# Patient Record
Sex: FEMALE | Race: WHITE | Employment: FULL TIME | URBAN - METROPOLITAN AREA
[De-identification: names, ages, dates, MRNs, and addresses within clinical notes are randomized per-mention and may not be internally consistent; named-entity substitution may affect disease eponyms.]

---

## 2017-02-13 ENCOUNTER — TELEPHONE (OUTPATIENT)
Dept: INTERNAL MEDICINE CLINIC | Age: 42
End: 2017-02-13

## 2017-02-13 NOTE — TELEPHONE ENCOUNTER
----- Message from Neeraj Velasco sent at 2/13/2017 11:51 AM EST -----  Regarding: Dr. Flaca Parham  Patient is in [place] and would like a prescription of Zofran called into the [facility] at [phone]. Best contact number [phone].

## 2017-11-09 ENCOUNTER — OFFICE VISIT (OUTPATIENT)
Dept: INTERNAL MEDICINE CLINIC | Age: 42
End: 2017-11-09

## 2017-11-09 VITALS
DIASTOLIC BLOOD PRESSURE: 73 MMHG | OXYGEN SATURATION: 99 % | BODY MASS INDEX: 23.41 KG/M2 | WEIGHT: 124 LBS | RESPIRATION RATE: 16 BRPM | HEIGHT: 61 IN | SYSTOLIC BLOOD PRESSURE: 110 MMHG | TEMPERATURE: 97.9 F | HEART RATE: 68 BPM

## 2017-11-09 DIAGNOSIS — F33.8 SEASONAL AFFECTIVE DISORDER (HCC): ICD-10-CM

## 2017-11-09 DIAGNOSIS — Z12.39 BREAST CANCER SCREENING: ICD-10-CM

## 2017-11-09 DIAGNOSIS — Z12.4 CERVICAL CANCER SCREENING: ICD-10-CM

## 2017-11-09 DIAGNOSIS — Z00.00 WELL ADULT EXAM: Primary | ICD-10-CM

## 2017-11-09 DIAGNOSIS — Z12.83 SKIN CANCER SCREENING: ICD-10-CM

## 2017-11-09 RX ORDER — BUPROPION HYDROCHLORIDE 150 MG/1
150 TABLET, EXTENDED RELEASE ORAL DAILY
Qty: 90 TAB | Refills: 3 | Status: SHIPPED | OUTPATIENT
Start: 2017-11-09 | End: 2018-11-14 | Stop reason: SDUPTHER

## 2017-11-09 RX ORDER — CETIRIZINE HCL 10 MG
TABLET ORAL
COMMUNITY
End: 2019-08-07 | Stop reason: ALTCHOICE

## 2017-11-09 NOTE — PATIENT INSTRUCTIONS

## 2017-11-09 NOTE — MR AVS SNAPSHOT
Visit Information Date & Time Provider Department Dept. Phone Encounter #  
 11/9/2017  1:00 PM Zenobia Jackson MD [facility] [address] [id number] Upcoming Health Maintenance Date Due  
 PAP AKA CERVICAL CYTOLOGY 6/8/2019 DTaP/Tdap/Td series (2 - Td) 7/17/2025 Allergies as of 11/9/2017  Review Complete On: 11/9/2017 By: Zenobia Jackson MD  
 No Known Allergies Current Immunizations  Reviewed on 8/1/2014 Name Date Hep B Vaccine 1/1/1994 Influenza Vaccine 10/1/2013 TB Skin Test (PPD) 1/1/2011 Tdap 7/17/2015 12:56 PM  
  
 Not reviewed this visit You Were Diagnosed With   
  
 Codes Comments Well adult exam    -  Primary ICD-10-CM: Z00.00 ICD-9-CM: V70.0 Cervical cancer screening     ICD-10-CM: Z12.4 ICD-9-CM: V76.2 Breast cancer screening     ICD-10-CM: Z12.31 
ICD-9-CM: V76.10 Skin cancer screening     ICD-10-CM: Z12.83 ICD-9-CM: V76.43 Seasonal affective disorder ([address]     ICD-10-CM: F33.9 ICD-9-CM: 296.99 Vitals BP Pulse Temp Resp Height(growth percentile) Weight(growth percentile) 110/73 (BP 1 Location: Left arm, BP Patient Position: Sitting) 68 97.9 °F (36.6 °C) (Oral) 16 5' 1\" (1.549 m) 124 lb (56.2 kg) SpO2 BMI OB Status Smoking Status 99% 23.43 kg/m2 IUD Never Smoker Vitals History BMI and BSA Data Body Mass Index Body Surface Area  
 23.43 kg/m 2 1.56 m 2 Preferred Pharmacy Pharmacy Name Phone [id number] [phone] Your Updated Medication List  
  
   
This list is accurate as of: 11/9/17  2:06 PM.  Always use your most recent med list.  
  
  
  
  
 albuterol 90 mcg/actuation inhaler Commonly known as:  PROVENTIL HFA, VENTOLIN HFA, PROAIR HFA Take 2 Puffs by inhalation every four (4) hours as needed for Wheezing. buPROPion  mg SR tablet Commonly known as:  Daysi Araiza Take 1 Tab by mouth daily. estradiol 0.01 % (0.1 mg/gram) vaginal cream  
Commonly known as:  ESTRACE Apply with cotton swab once three times weekly. VITAMIN D3 1,000 unit Cap Generic drug:  cholecalciferol Take  by mouth daily. ZyrTEC 10 mg tablet Generic drug:  cetirizine Take  by mouth. Prescriptions Sent to Pharmacy Refills buPROPion SR (WELLBUTRIN SR) 150 mg SR tablet 3 Sig: Take 1 Tab by mouth daily. Class: Normal  
 Pharmacy: [id number] [place], [address] Ph #: [phone] Route: Oral  
  
We Performed the Following CBC W/O DIFF [07795 CPT(R)] LIPID PANEL [08569 CPT(R)] METABOLIC PANEL, COMPREHENSIVE [77403 CPT(R)] PAP IG, APTIMA HPV AND RFX 16/18,45 ([id number]) [[id number] Custom] REFERRAL TO DERMATOLOGY [REF19 Custom] Comments:  
 screening TSH 3RD GENERATION [51790 CPT(R)] VITAMIN D, 25 HYDROXY [id number] CPT(R)] To-Do List   
 11/09/2017 Imaging:  [facility] BI SCREENING INCL CAD Referral Information Referral ID Referred By Referred To  
  
 [id number] Derick Guido MD   
   [id number] [facility] [address] , [address] [place] [address] Phone: [phone] Fax: [phone] Visits Status Start Date End Date 1 Pending Review 11/9/17 11/9/18 If your referral has a status of pending review or denied, additional information will be sent to support the outcome of this decision. Patient Instructions Well Visit, Ages 25 to 48: Care Instructions Your Care Instructions Physical exams can help you stay healthy. Your doctor has checked your overall health and may have suggested ways to take good care of yourself. He or she also may have recommended tests.  At home, you can help prevent illness with healthy eating, regular exercise, and other steps. Follow-up care is a key part of your treatment and safety. Be sure to make and go to all appointments, and call your doctor if you are having problems. It's also a good idea to know your test results and keep a list of the medicines you take. How can you care for yourself at home? · Reach and stay at a healthy weight. This will lower your risk for many problems, such as obesity, diabetes, heart disease, and high blood pressure. · Get at least 30 minutes of physical activity on most days of the week. Walking is a good choice. You also may want to do other activities, such as running, swimming, cycling, or playing tennis or team sports. Discuss any changes in your exercise program with your doctor. · Do not smoke or allow others to smoke around you. If you need help quitting, talk to your doctor about stop-smoking programs and medicines. These can increase your chances of quitting for good. · Talk to your doctor about whether you have any risk factors for sexually transmitted infections (STIs). Having one sex partner (who does not have STIs and does not have sex with anyone else) is a good way to avoid these infections. · Use birth control if you do not want to have children at this time. Talk with your doctor about the choices available and what might be best for you. · Protect your skin from too much sun. When you're outdoors from 10 a.m. to 4 p.m., stay in the shade or cover up with clothing and a hat with a wide brim. Wear sunglasses that block UV rays. Even when it's cloudy, put broad-spectrum sunscreen (SPF 30 or higher) on any exposed skin. · See a dentist one or two times a year for checkups and to have your teeth cleaned. · Wear a seat belt in the car. · Drink alcohol in moderation, if at all. That means no more than 2 drinks a day for men and 1 drink a day for women. Follow your doctor's advice about when to have certain tests. These tests can spot problems early. For everyone · Cholesterol. Have the fat (cholesterol) in your blood tested after age 21. Your doctor will tell you how often to have this done based on your age, family history, or other things that can increase your risk for heart disease. · Blood pressure. Have your blood pressure checked during a routine doctor visit. Your doctor will tell you how often to check your blood pressure based on your age, your blood pressure results, and other factors. · Vision. Talk with your doctor about how often to have a glaucoma test. 
· Diabetes. Ask your doctor whether you should have tests for diabetes. · Colon cancer. Have a test for colon cancer at age 48. You may have one of several tests. If you are younger than 48, you may need a test earlier if you have any risk factors. Risk factors include whether you already had a precancerous polyp removed from your colon or whether your parent, brother, sister, or child has had colon cancer. For women · Breast exam and mammogram. Talk to your doctor about when you should have a clinical breast exam and a mammogram. Medical experts differ on whether and how often women under 50 should have these tests. Your doctor can help you decide what is right for you. · Pap test and pelvic exam. Begin Pap tests at age 24. A Pap test is the best way to find cervical cancer. The test often is part of a pelvic exam. Ask how often to have this test. 
· Tests for sexually transmitted infections (STIs). Ask whether you should have tests for STIs. You may be at risk if you have sex with more than one person, especially if your partners do not wear condoms. For men · Tests for sexually transmitted infections (STIs). Ask whether you should have tests for STIs. You may be at risk if you have sex with more than one person, especially if you do not wear a condom. · Testicular cancer exam. Ask your doctor whether you should check your testicles regularly. · Prostate exam. Talk to your doctor about whether you should have a blood test (called a PSA test) for prostate cancer. Experts differ on whether and when men should have this test. Some experts suggest it if you are older than 39 and are -American or have a father or brother who got prostate cancer when he was younger than 72. When should you call for help? Watch closely for changes in your health, and be sure to contact your doctor if you have any problems or symptoms that concern you. Where can you learn more? Go to http://nava-jaime.info/. Enter P072 in the search box to learn more about \"Well Visit, Ages 25 to 48: Care Instructions. \" Current as of: May 12, 2017 Content Version: 11.4 © 9237-9675 [employer]. Care instructions adapted under license by [employer] (which disclaims liability or warranty for this information). If you have questions about a medical condition or this instruction, always ask your healthcare professional. [place] [address] any warranty or liability for your use of this information. Introducing [address]! Dear Ricky Cuenca: Thank you for requesting a [employer] account. Our records indicate that you already have an active [employer] account. You can access your account anytime at [email or web address] Did you know that you can access your hospital and ER discharge instructions at any time in [employer]? You can also review all of your test results from your hospital stay or ER visit. Additional Information If you have questions, please visit the Frequently Asked Questions section of the [employer] website at [email or web address] Remember, [employer] is NOT to be used for urgent needs. For medical emergencies, dial 911. Now available from your iPhone and Android! Please provide this summary of care documentation to your next provider. Your primary care clinician is listed as Aleisha Yeh. If you have any questions after today's visit, please call [phone].

## 2017-11-09 NOTE — PROGRESS NOTES
Chief Complaint   Patient presents with    Complete Physical      Aracely Nix is a 38 y.o. female and presents with Complete Physical  .daughter of Katherine Mendes from [address] is here for her annual.          Subjective:  Depression  Since her last visit she had a rough go through the summer. She reports she was struggling. She read Fl[facility]ish by Dr. Evertt Gaucher. She reports she was really depressed. Her neighbor told her about CBT study at [facility]. She feels she is better now following CBT . Her MH now is 7/10 while on wellbutin. She enjoys playing tennis. She did an online assessment and found she was depressed. She is not taking zoloft not on for > 6 months. She does not think it was when she came of the zoloft. Menopausal bleed  X 5 days  New symptoms per pt as she has not had menses in 7 years     Allergies  Taking prn benedryl    Review of Systems  Constitutional: negative for fevers, chills, anorexia and weight loss  Eyes:   negative for visual disturbance and irritation  ENT:   negative for tinnitus,sore throat,nasal congestion,ear pains. hoarseness  Respiratory:  negative for cough, hemoptysis, dyspnea,wheezing  CV:   negative for chest pain, palpitations, lower extremity edema  GI:   negative for nausea, vomiting, diarrhea, abdominal pain,melena  Endo:               negative for polyuria,polydipsia,polyphagia,heat intolerance  Genitourinary: negative for frequency, dysuria and hematuria  Integument:  negative for rash and pruritus  Hematologic:  negative for easy bruising and gum/nose bleeding  Musculoskel: negative for myalgias, arthralgias, back pain, muscle weakness, joint pain  Neurological:  negative for headaches, dizziness, vertigo, memory problems and gait   Behavl/Psych: negative for feelings of anxiety, depression, mood changes    Past Medical History:   Diagnosis Date    Allergic rhinitis, seasonal     Depression     HX OTHER MEDICAL 2000    Pneumothorax    Pneumothorax, spontaneous, tension 2000    26 yo (2001 and 2002), right sided     Past Surgical History:   Procedure Laterality Date    HX BREAST AUGMENTATION  10/2012    HX HEENT  2/2015    nasal     HX OTHER SURGICAL  2000    Pneumothroax     Social History     Social History    Marital status:      Spouse name: N/A    Number of children: N/A    Years of education: N/A     Occupational History    RN      Social History Main Topics    Smoking status: Never Smoker    Smokeless tobacco: Never Used    Alcohol use Yes      Comment: 6-8 glasses of wine per week    Drug use: No    Sexual activity: Yes     Partners: Male     Other Topics Concern    None     Social History Narrative    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 Works t-r; 8-5 pm     Family History   Problem Relation Age of Onset    Heart Disease Other      Paternal Side of Family    Other Maternal Grandfather      Aneurysm    Stroke Maternal Grandfather     Stroke Maternal Grandmother     Heart Disease Paternal Grandmother     Cancer Paternal Grandfather      lung    Other Mother 72     osteoporosis    Thyroid Disease Father     Breast Cancer Neg Hx      Current Outpatient Prescriptions   Medication Sig Dispense Refill    cetirizine (ZYRTEC) 10 mg tablet Take  by mouth.  buPROPion SR (WELLBUTRIN SR) 150 mg SR tablet Take 1 Tab by mouth daily. 90 Tab 3    ondansetron (ZOFRAN ODT) 4 mg disintegrating tablet Take 1 Tab by mouth every eight (8) hours as needed for Nausea. 30 Tab 0    guaiFENesin-codeine (ROBITUSSIN AC)  mg/5 mL solution Take 5 mL by mouth three (3) times daily as needed for Cough. Max Daily Amount: 15 mL. 100 mL 0    IBUPROFEN (MOTRIN PO) Take 800 mg by mouth three (3) times daily (with meals).  acetaminophen (TYLENOL) 500 mg tablet Take 650 mg by mouth every six (6) hours as needed for Pain.       albuterol (PROVENTIL HFA, VENTOLIN HFA, PROAIR HFA) 90 mcg/actuation inhaler Take 2 Puffs by inhalation every four (4) hours as needed for Wheezing. 1 Inhaler 1    sertraline (ZOLOFT) 50 mg tablet Take 1 Tab by mouth daily. Indications: SEASONAL AFFECTIVE DISORDER 90 Tab 3    Cholecalciferol, Vitamin D3, (VITAMIN D3) 1,000 unit cap Take  by mouth daily.  ondansetron (ZOFRAN ODT) 8 mg disintegrating tablet Take 1 Tab by mouth every eight (8) hours as needed for Nausea. 6 Tab 0    estradiol (ESTRACE) 0.01 % (0.1 mg/gram) vaginal cream Apply with cotton swab once three times weekly. 42.5 g 0     No Known Allergies    Objective:  Visit Vitals    /73 (BP 1 Location: Left arm, BP Patient Position: Sitting)    Pulse 68    Temp 97.9 °F (36.6 °C) (Oral)    Resp 16    Ht 5' 1\" (1.549 m)    Wt 124 lb (56.2 kg)    SpO2 99%    BMI 23.43 kg/m2     Physical Exam:   General appearance - alert, well appearing, and in no distress  Mental status - alert, oriented to person, place, and time  EYE-ZAYNAB, EOMI, visual acuity normal both eyes  ENT-ENT exam normal, no neck nodes or sinus tenderness  Nose - normal and patent, no erythema, discharge or polyps  Mouth - mucous membranes moist, pharynx normal without lesions  Neck - supple, no significant adenopathy   Chest - clear to auscultation, no wheezes, rales or rhonchi, symmetric air entry   Heart - normal rate, regular rhythm, normal S1, S2, no murmurs, rubs, clicks or gallops   Abdomen - soft, nontender, nondistended, no masses or organomegaly  Lymph- no adenopathy palpable  Ext-peripheral pulses normal, no pedal edema, no clubbing or cyanosis  Skin-Warm and dry.  no hyperpigmentation, vitiligo, or suspicious lesions  Neuro -alert, oriented, normal speech, no focal findings or movement disorder noted  Neck-normal C-spine, no tenderness, full ROM without pain  Gyn: no inguinal LAD, cervix in view and IUD noted      Results for orders placed or performed in visit on 09/07/16   CBC WITH AUTOMATED DIFF   Result Value Ref Range    WBC 11.2 (H) 3.4 - 10.8 x10E3/uL    RBC 4.38 3.77 - 5.28 x10E6/uL    HGB 13.6 11.1 - 15.9 g/dL    HCT 41.5 34.0 - 46.6 %    MCV 95 79 - 97 fL    MCH 31.1 26.6 - 33.0 pg    MCHC 32.8 31.5 - 35.7 g/dL    RDW 12.6 12.3 - 15.4 %    PLATELET 928 013 - 974 x10E3/uL    NEUTROPHILS 64 %    Lymphocytes 23 %    MONOCYTES 8 %    EOSINOPHILS 5 %    BASOPHILS 0 %    ABS. NEUTROPHILS 7.0 1.4 - 7.0 x10E3/uL    Abs Lymphocytes 2.6 0.7 - 3.1 x10E3/uL    ABS. MONOCYTES 0.9 0.1 - 0.9 x10E3/uL    ABS. EOSINOPHILS 0.6 (H) 0.0 - 0.4 x10E3/uL    ABS. BASOPHILS 0.0 0.0 - 0.2 x10E3/uL    IMMATURE GRANULOCYTES 0 %    ABS. IMM. GRANS. 0.0 0.0 - 0.1 x10E3/uL   SED RATE (ESR)   Result Value Ref Range    Sed rate (ESR) 2 0 - 32 mm/hr   METABOLIC PANEL, COMPREHENSIVE   Result Value Ref Range    Glucose 76 65 - 99 mg/dL    BUN 14 6 - 20 mg/dL    Creatinine 0.87 0.57 - 1.00 mg/dL    GFR est non-AA 84 >59 mL/min/1.73    GFR est AA 97 >59 mL/min/1.73    BUN/Creatinine ratio 16 8 - 20    Sodium 141 134 - 144 mmol/L    Potassium 3.6 3.5 - 5.2 mmol/L    Chloride 100 97 - 108 mmol/L    CO2 24 18 - 29 mmol/L    Calcium 9.2 8.7 - 10.2 mg/dL    Protein, total 6.9 6.0 - 8.5 g/dL    Albumin 4.4 3.5 - 5.5 g/dL    GLOBULIN, TOTAL 2.5 1.5 - 4.5 g/dL    A-G Ratio 1.8 1.1 - 2.5    Bilirubin, total 0.5 0.0 - 1.2 mg/dL    Alk.  phosphatase 50 39 - 117 IU/L    AST (SGOT) 18 0 - 40 IU/L    ALT (SGPT) 12 0 - 32 IU/L   TSH 3RD GENERATION   Result Value Ref Range    TSH 2.320 0.450 - 4.500 uIU/mL   MAGNESIUM   Result Value Ref Range    Magnesium 2.0 1.6 - 2.3 mg/dL   URINALYSIS W/MICROSCOPIC   Result Value Ref Range    Specific Gravity 1.012 1.005 - 1.030    pH (UA) 6.0 5.0 - 7.5    Color Yellow Yellow    Appearance Clear Clear    Leukocyte Esterase Negative Negative    Protein Negative Negative/Trace    Glucose Negative Negative    Ketone Negative Negative    Blood Negative Negative    Bilirubin Negative Negative    Urobilinogen 0.2 0.2 - 1.0 mg/dL    Nitrites Negative Negative    Microscopic Examination Comment     Microscopic exam See additional order    MICROSCOPIC EXAMINATION   Result Value Ref Range    WBC 0-5 0 - 5 /hpf    RBC None seen 0 - 2 /hpf    Epithelial cells 0-10 0 - 10 /hpf    Casts None seen None seen /lpf    Mucus Present Not Estab. Bacteria Few None seen/Few     Prevention    Cardiovascular profile  Family hx  Exercising:  Tennis, starting with  to do cross . Blood pressure:  Health healthy diet:  Diabetes:  Cholesterol:  Renal function:      Cancer risk profile  Mammogram start, on IUd ring  Lung no sx, exertional   Colonoscopy no sxy  Skin nonhealing in 2 weeks, emeli Elkins  Gyn abnormal bleeding/discharge/abd pain/pressure      Thyroid sx  Weight stable    Osteopenia prevention  Calcium 1000mg/day yes  Vitamin D 800iu/day yes    Mental health scale: 8-9/10  Depression  Anxiety  Sleep # of hours:  Energy Level:        Immunizations uptodate  TDAP ?2011  Pneumonia vaccine  Flu vaccine annually  Shingles vaccine  HPV      . Diagnoses and all orders for this visit:    1. Well adult exam  Pt presents in overall good health. She went thru a period of depression and improved with CBT training from [facility]. Discussed with her to monitor [address] through fall season and can increase wellbutrin if needed. -     CBC W/O DIFF  -     METABOLIC PANEL, COMPREHENSIVE  -     LIPID PANEL  -     TSH 3RD GENERATION  -     VITAMIN D, 25 HYDROXY    2. Cervical cancer screening  DISCUSSED WITH PT SHE NEEDS TO FOLLOW UP WITH GYN RE: NEW BLEEDING WITH IUD. Pt agrees and will call  -     PAP IG, APTIMA HPV AND RFX 16/18,45 ([id number])    3. Breast cancer screening  -     [facility] MAMMO BI SCREENING INCL CAD; Future    4.  Skin cancer screening  -     REFERRAL TO DERMATOLOGY    5. Seasonal affective disorder ([facility] [address]  Improved with CBT  Can increase wellbutrin if needed but I don't think she needs right now  Encouraged activity with tennis indoor/outdoor as tolerated  -     buPROPion SR (WELLBUTRIN SR) 150 mg SR tablet; Take 1 Tab by mouth daily. This note will not be viewable in [address]

## 2017-11-10 LAB
25(OH)D3+25(OH)D2 SERPL-MCNC: 43.7 NG/ML (ref 30–100)
ALBUMIN SERPL-MCNC: 4.3 G/DL (ref 3.5–5.5)
ALBUMIN/GLOB SERPL: 2 {RATIO} (ref 1.2–2.2)
ALP SERPL-CCNC: 53 IU/L (ref 39–117)
ALT SERPL-CCNC: 11 IU/L (ref 0–32)
AST SERPL-CCNC: 18 IU/L (ref 0–40)
BILIRUB SERPL-MCNC: 0.4 MG/DL (ref 0–1.2)
BUN SERPL-MCNC: 19 MG/DL (ref 6–24)
BUN/CREAT SERPL: 20 (ref 9–23)
CALCIUM SERPL-MCNC: 8.9 MG/DL (ref 8.7–10.2)
CHLORIDE SERPL-SCNC: 104 MMOL/L (ref 96–106)
CHOLEST SERPL-MCNC: 134 MG/DL (ref 100–199)
CO2 SERPL-SCNC: 24 MMOL/L (ref 18–29)
CREAT SERPL-MCNC: 0.96 MG/DL (ref 0.57–1)
ERYTHROCYTE [DISTWIDTH] IN BLOOD BY AUTOMATED COUNT: 13 % (ref 12.3–15.4)
GFR SERPLBLD CREATININE-BSD FMLA CKD-EPI: 74 ML/MIN/1.73
GFR SERPLBLD CREATININE-BSD FMLA CKD-EPI: 86 ML/MIN/1.73
GLOBULIN SER CALC-MCNC: 2.2 G/DL (ref 1.5–4.5)
GLUCOSE SERPL-MCNC: 80 MG/DL (ref 65–99)
HCT VFR BLD AUTO: 36.7 % (ref 34–46.6)
HDLC SERPL-MCNC: 66 MG/DL
HGB BLD-MCNC: 12.2 G/DL (ref 11.1–15.9)
INTERPRETATION, 910389: NORMAL
LDLC SERPL CALC-MCNC: 58 MG/DL (ref 0–99)
MCH RBC QN AUTO: 30.4 PG (ref 26.6–33)
MCHC RBC AUTO-ENTMCNC: 33.2 G/DL (ref 31.5–35.7)
MCV RBC AUTO: 92 FL (ref 79–97)
PLATELET # BLD AUTO: 197 X10E3/UL (ref 150–379)
POTASSIUM SERPL-SCNC: 4 MMOL/L (ref 3.5–5.2)
PROT SERPL-MCNC: 6.5 G/DL (ref 6–8.5)
RBC # BLD AUTO: 4.01 X10E6/UL (ref 3.77–5.28)
SODIUM SERPL-SCNC: 141 MMOL/L (ref 134–144)
TRIGL SERPL-MCNC: 52 MG/DL (ref 0–149)
TSH SERPL DL<=0.005 MIU/L-ACNC: 1.34 UIU/ML (ref 0.45–4.5)
VLDLC SERPL CALC-MCNC: 10 MG/DL (ref 5–40)
WBC # BLD AUTO: 10.6 X10E3/UL (ref 3.4–10.8)

## 2017-11-13 ENCOUNTER — TELEPHONE (OUTPATIENT)
Dept: INTERNAL MEDICINE CLINIC | Age: 42
End: 2017-11-13

## 2017-11-13 DIAGNOSIS — J45.909 RAD (REACTIVE AIRWAY DISEASE), UNSPECIFIED ASTHMA SEVERITY, UNCOMPLICATED: ICD-10-CM

## 2017-11-13 NOTE — TELEPHONE ENCOUNTER
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 Pt can be reached at [phone].

## 2017-11-14 RX ORDER — ALBUTEROL SULFATE 90 UG/1
2 AEROSOL, METERED RESPIRATORY (INHALATION)
Qty: 1 INHALER | Refills: 1 | Status: SHIPPED | OUTPATIENT
Start: 2017-11-14 | End: 2022-01-14 | Stop reason: ALTCHOICE

## 2017-11-14 NOTE — TELEPHONE ENCOUNTER
Patient stated she has exercise induced asthma and would like PCP to prescribed something that she can use prior to exercise. Pt also stated she would like a refill of her albuterol.

## 2018-01-31 ENCOUNTER — OFFICE VISIT (OUTPATIENT)
Dept: INTERNAL MEDICINE CLINIC | Age: 43
End: 2018-01-31

## 2018-01-31 VITALS
HEART RATE: 77 BPM | BODY MASS INDEX: 19.46 KG/M2 | SYSTOLIC BLOOD PRESSURE: 119 MMHG | DIASTOLIC BLOOD PRESSURE: 80 MMHG | WEIGHT: 124 LBS | OXYGEN SATURATION: 99 % | RESPIRATION RATE: 17 BRPM | TEMPERATURE: 97.9 F | HEIGHT: 67 IN

## 2018-01-31 DIAGNOSIS — R10.30 LOWER ABDOMINAL PAIN: Primary | ICD-10-CM

## 2018-01-31 RX ORDER — HYOSCYAMINE SULFATE 0.12 MG/1
0.12 TABLET, ORALLY DISINTEGRATING ORAL
Qty: 15 TAB | Refills: 0 | Status: SHIPPED | OUTPATIENT
Start: 2018-01-31 | End: 2020-01-16 | Stop reason: ALTCHOICE

## 2018-01-31 NOTE — MR AVS SNAPSHOT
[facility] 
 
 
 [address] [place] [address] [facility] 
[phone] Patient: Gardenia Garsia MRN: [id number] GMA:[birth year] Visit Information Date & Time Provider Department Dept. Phone Encounter #  
 1/31/2018 12:20 PM Rk Gaines MD [facility] [address] [id number] Upcoming Health Maintenance Date Due  
 PAP AKA CERVICAL CYTOLOGY 11/15/2020 DTaP/Tdap/Td series (2 - Td) 7/17/2025 Allergies as of 1/31/2018  Review Complete On: 1/31/2018 By: Rk Gaines MD  
 No Known Allergies Current Immunizations  Reviewed on 8/1/2014 Name Date Hep B Vaccine 1/1/1994 Influenza Vaccine 10/1/2013 TB Skin Test (PPD) 1/1/2011 Tdap 7/17/2015 12:56 PM  
  
 Not reviewed this visit You Were Diagnosed With   
  
 Codes Comments Lower abdominal pain    -  Primary ICD-10-CM: R10.30 ICD-9-CM: 789.09 Vitals BP Pulse Temp Resp Height(growth percentile) Weight(growth percentile) 119/80 77 97.9 °F (36.6 °C) (Oral) 17 5' 7\" (1.702 m) 124 lb (56.2 kg) SpO2 BMI OB Status Smoking Status 99% 19.42 kg/m2 IUD Never Smoker BMI and BSA Data Body Mass Index Body Surface Area  
 19.42 kg/m 2 1.63 m 2 Preferred Pharmacy Pharmacy Name Phone [id number] [address] [phone] Your Updated Medication List  
  
   
This list is accurate as of: 1/31/18  2:02 PM.  Always use your most recent med list.  
  
  
  
  
 albuterol 90 mcg/actuation inhaler Commonly known as:  PROVENTIL HFA, VENTOLIN HFA, PROAIR HFA Take 2 Puffs by inhalation every four (4) hours as needed for Wheezing. buPROPion  mg SR tablet Commonly known as:  Jaimie Torres Take 1 Tab by mouth daily.   
  
 estradiol 0.01 % (0.1 mg/gram) vaginal cream  
Commonly known as:  ESTRACE  
 Apply with cotton swab once three times weekly. hyoscyamine sulfate 0.125 mg tablet Commonly known as:  Олег Powell Take 1 Tab by mouth every four (4) hours as needed. VITAMIN D3 1,000 unit Cap Generic drug:  cholecalciferol Take  by mouth daily. ZyrTEC 10 mg tablet Generic drug:  cetirizine Take  by mouth. Prescriptions Sent to Pharmacy Refills  
 hyoscyamine sulfate (NULEV) 0.125 mg tablet 0 Sig: Take 1 Tab by mouth every four (4) hours as needed. Class: Normal  
 Pharmacy: [facility]/pharmacy #[id number] Ph #: [phone] Route: Oral  
  
Patient Instructions Abdominal Pain: Care Instructions Your Care Instructions Abdominal pain has many possible causes. Some aren't serious and get better on their own in a few days. Others need more testing and treatment. If your pain continues or gets worse, you need to be rechecked and may need more tests to find out what is wrong. You may need surgery to correct the problem. Don't ignore new symptoms, such as fever, nausea and vomiting, urination problems, pain that gets worse, and dizziness. These may be signs of a more serious problem. Your doctor may have recommended a follow-up visit in the next 8 to 12 hours. If you are not getting better, you may need more tests or treatment. The doctor has checked you carefully, but problems can develop later. If you notice any problems or new symptoms, get medical treatment right away. Follow-up care is a key part of your treatment and safety. Be sure to make and go to all appointments, and call your doctor if you are having problems. It's also a good idea to know your test results and keep a list of the medicines you take. How can you care for yourself at home? · Rest until you feel better.  
· To prevent dehydration, drink plenty of fluids, enough so that your urine is light yellow or clear like water. Choose water and other caffeine-free clear liquids until you feel better. If you have kidney, heart, or liver disease and have to limit fluids, talk with your doctor before you increase the amount of fluids you drink. · If your stomach is upset, eat mild foods, such as rice, dry toast or crackers, bananas, and applesauce. Try eating several small meals instead of two or three large ones. · Wait until 48 hours after all symptoms have gone away before you have spicy foods, alcohol, and drinks that contain caffeine. · Do not eat foods that are high in fat. · Avoid anti-inflammatory medicines such as aspirin, ibuprofen (Advil, Motrin), and naproxen (Aleve). These can cause stomach upset. Talk to your doctor if you take daily aspirin for another health problem. When should you call for help? Call 911 anytime you think you may need emergency care. For example, call if: 
? · You passed out (lost consciousness). ? · You pass maroon or very bloody stools. ? · You vomit blood or what looks like coffee grounds. ? · You have new, severe belly pain. ?Call your doctor now or seek immediate medical care if: 
? · Your pain gets worse, especially if it becomes focused in one area of your belly. ? · You have a new or higher fever. ? · Your stools are black and look like tar, or they have streaks of blood. ? · You have unexpected vaginal bleeding. ? · You have symptoms of a urinary tract infection. These may include: 
¨ Pain when you urinate. ¨ Urinating more often than usual. 
¨ Blood in your urine. ? · You are dizzy or lightheaded, or you feel like you may faint. ? Watch closely for changes in your health, and be sure to contact your doctor if: 
? · You are not getting better after 1 day (24 hours). Where can you learn more? Go to http://nava-jaime.info/.  
Enter W609 in the search box to learn more about \"Abdominal Pain: Care Instructions. \" Current as of: March 20, 2017 Content Version: 11.4 © 1779-0396 [employer]. Care instructions adapted under license by [employer] (which disclaims liability or warranty for this information). If you have questions about a medical condition or this instruction, always ask your healthcare professional. [address] any warranty or liability for your use of this information. Hyoscyamine (By mouth) Hyoscyamine ([id number]-bt-QWV-a-meen) Reduces muscle activity, including muscle spasms in the digestive system. Dries and reduces secretions, such as reducing acid in the stomach. May treat allergy symptoms and several other conditions. Brand Name(s): Anaspaz, Ed-Spaz, Hyosyne, Levbid, Levsin, Levsin/SL, NuLev, Oscimin, Oscimin-SR, Symax DuoTab, Symax FasTabs, Symax-SL, Symax-SR There may be other brand names for this medicine. When This Medicine Should Not Be Used: This medicine is not right for everyone. Do not use it if you had an allergic reaction to hyoscyamine. How to Use This Medicine:  
Tablet, Spray, Chewable Tablet, Dissolving Tablet, Long Acting Tablet, Long Acting Capsule, Liquid · Your doctor will tell you how much medicine to use. Do not use more than directed. · Measure the oral liquid medicine with a marked measuring spoon, oral syringe, or medicine cup. If the medicine came with a dropper, use the dropper to measure each dose. · Make sure your hands are dry before you handle the disintegrating tablet. Peel back the foil from the blister pack, then remove the tablet. Do not push the tablet through the foil. Place the tablet in your mouth. After it has melted, swallow or take a drink of water. · Swallow the extended-release tablet whole. Do not crush, break, or chew it. · Tablet: You might need to take the regular tablet about 30 minutes to 1 hour before you eat a meal. Ask your pharmacist about your specific brand. · Missed dose: Take a dose as soon as you remember. If it is almost time for your next dose, wait until then and take a regular dose. Do not take extra medicine to make up for a missed dose. · Store the medicine in a closed container at room temperature, away from heat, moisture, and direct light. Drugs and Foods to Avoid: Ask your doctor or pharmacist before using any other medicine, including over-the-counter medicines, vitamins, and herbal products. · Some foods and medicines can affect how hyoscyamine works. Tell your doctor if you are also using any of the following: ¨ Amantadine, ketoconazole, haloperidol, metoclopramide, potassium supplement ¨ Antihistamine ¨ Narcotic pain medicine ¨ Phenothiazine medicine, including chlorpromazine, perphenazine, promethazine, prochlorperazine, thioridazine ¨ MAO inhibitor ¨ Tricyclic antidepressant ¨ Medicine to treat or prevent diarrhea · If you take an antacid, do not take it at the same time you take hyoscyamine. Take your hyoscyamine before a meal and then the antacid after the meal. 
Warnings While Using This Medicine: · Tell your doctor if you are pregnant or breastfeeding, or if you have glaucoma, trouble urinating, myasthenia gravis, overactive thyroid, kidney disease, high blood pressure, heart rhythm problems, heart disease, or autonomic neuropathy. Tell your doctor about all digestion problems, including colitis, reflux disease (GERD), blocked intestines, or gastric ulcer. · This medicine may cause you to sweat less and overheat. Avoid hot temperatures. · This medicine may make you dizzy or drowsy or give you blurred vision. Do not drive or do anything else that could be dangerous until you know how this medicine affects you. · Keep all medicine out of the reach of children. Never share your medicine with anyone. Possible Side Effects While Using This Medicine:  
Call your doctor right away if you notice any of these side effects: · Allergic reaction: Itching or hives, swelling in your face or hands, swelling or tingling in your mouth or throat, chest tightness, trouble breathing · Blurred vision that does not go away · Fast heartbeat, dizziness · Unusual behavior, such as confusion, memory loss, anxiety, trouble sleeping, or hallucinations If you notice these less serious side effects, talk with your doctor: · Dry mouth If you notice other side effects that you think are caused by this medicine, tell your doctor. Call your doctor for medical advice about side effects. You may report side effects to FDA at 5-743-FLD-1870 © 2017 [address] Information is for End User's use only and may not be sold, redistributed or otherwise used for commercial purposes. The above information is an  only. It is not intended as medical advice for individual conditions or treatments. Talk to your doctor, nurse or pharmacist before following any medical regimen to see if it is safe and effective for you. Introducing [facility]! Dear [address] [place] [address],[address]: Thank you for requesting a Divvyshot account. Our records indicate that you already have an active Divvyshot account. You can access your account anytime at [email or web address] Did you know that you can access your hospital and ER discharge instructions at any time in Divvyshot? You can also review all of your test results from your hospital stay or ER visit. Additional Information If you have questions, please visit the Frequently Asked Questions section of the Divvyshot website at [email or web address] Remember, Divvyshot is NOT to be used for urgent needs. For medical emergencies, dial 911. Now available from your iPhone and Android! Please provide this summary of care documentation to your next provider. Your primary care clinician is listed as Clarisa Sutton.  If you have any questions after today's visit, please call [phone].

## 2018-01-31 NOTE — PROGRESS NOTES
Chief Complaint   Patient presents with    Abdominal Pain    Nausea    Gas    Constipation     Abdominal pain  Pt reports she has had 3 episodes of abdominal pain. It wakes her up at night. It starts at 11 pm lasts till 11 pm pain resoves by 6-7 in the am but can't eat and feels sick. She reports it starts in lower abdomen and radiates up her abdomen with time. No surgeries in abdomen, no prior sx or history in the past  Her first episode started at [place] and then had another 1 week later. She thought sx resolved but again occurred mid January. She had another event while eating dinner but later episode was not as severe. Sx are severe. She did not take any otc meds. She denies food allergies. She denies constipation. She reports when they occur she wakes up feeling anxious, realizes abdomen is off which becomes more cramping and nausea. Last week she felt her heart pounding. Took zofran so did not vomit. She is not anxious during the day. Started 1 month ago, not regular, not predictable. It is intermittent. Intensity the same with each episode. tues night cramping and nauseate. Carbonated drink stayed down sprite and cracker. She notes last week got better then worse. No blood in stool  Gyn exam 2016 at outside physician. sexually active, no pain with intercourse  May be explained by irregular eating. she was encouraged by her mother, Nataliya Montgomery to come in for evaluation.    She denies depression or anxiety  No neurological complaints      Past Medical History:   Diagnosis Date    Allergic rhinitis, seasonal     Depression     HX OTHER MEDICAL 2000    Pneumothorax    Pneumothorax, spontaneous, tension 58    24 yo (2001 and 2002), right sided     Past Surgical History:   Procedure Laterality Date    HX BREAST AUGMENTATION  10/2012    HX HEENT  2/2015    nasal     HX OTHER SURGICAL  2000    Pneumothroax     Social History     Social History    Marital status:      Spouse name: N/A    Number of children: N/A    Years of education: N/A     Occupational History    RN      Social History Main Topics    Smoking status: Never Smoker    Smokeless tobacco: Never Used    Alcohol use Yes      Comment: 6-8 glasses of wine per week    Drug use: No    Sexual activity: Yes     Partners: Male     Other Topics Concern    None     Social History Narrative    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    Skin care and     Home improvement projects, painting, pulled up carpet        Works t-r; 8-5 pm            Daughter 7 yo    Son 7     healthy     Family History   Problem Relation Age of Onset    Heart Disease Other      Paternal Side of Family    Other Maternal Grandfather      Aneurysm    Stroke Maternal Grandfather     Stroke Maternal Grandmother     Heart Disease Paternal Grandmother     Cancer Paternal Grandfather      lung    Other Mother 72     osteoporosis    Thyroid Disease Father     Breast Cancer Neg Hx      Current Outpatient Prescriptions   Medication Sig Dispense Refill    albuterol (PROVENTIL HFA, VENTOLIN HFA, PROAIR HFA) 90 mcg/actuation inhaler Take 2 Puffs by inhalation every four (4) hours as needed for Wheezing. 1 Inhaler 1    buPROPion SR (WELLBUTRIN SR) 150 mg SR tablet Take 1 Tab by mouth daily. 90 Tab 3    cetirizine (ZYRTEC) 10 mg tablet Take  by mouth.  Cholecalciferol, Vitamin D3, (VITAMIN D3) 1,000 unit cap Take  by mouth daily.  estradiol (ESTRACE) 0.01 % (0.1 mg/gram) vaginal cream Apply with cotton swab once three times weekly.  (Patient not taking: Reported on 1/31/2018) 42.5 g 0     No Known Allergies    Review of Systems - General ROS: positive for  - fatigue and sleep disturbance  negative for - chills or fever  Cardiovascular ROS: no chest pain or dyspnea on exertion  Respiratory ROS: no cough, shortness of breath, or wheezing    Visit Vitals    /80    Pulse 77    Temp 97.9 °F (36.6 °C) (Oral)    Resp 17    Ht 5' 7\" (1.702 m)    Wt 124 lb (56.2 kg)    SpO2 99%    BMI 19.42 kg/m2     General Appearance:  Well developed, well nourished,alert and oriented x 3, and individual in no acute distress. Ears/Nose/Mouth/Throat:   Hearing grossly normal.         Neck: Supple, no lad, no bruits   Chest:   Lungs clear to auscultation bilaterally. Cardiovascular:  Regular rate and rhythm, S1, S2 normal, no murmur. Abdomen:   Soft, non-tender, bowel sounds are active. Extremities: No edema bilaterally. Skin: Warm and dry, no suspicious lesions                 Diagnoses and all orders for this visit:    1. Lower abdominal pain  -     hyoscyamine sulfate (NULEV) 0.125 mg tablet; Take 1 Tab by mouth every four (4) hours as needed. I spent 25 min with this patient and >50% of the time was spent on counseling and management of her lower abdominal pain. Discussed differential with her abd vasospasm, mechanical type obstruction/intussipation but doubt, no abd surgeries so likely not adhesions, doubt diverticulitis or appendicitis low grade due to nature of recovery. We will try levsin because it may return if levsin works then know may be spastic component. She does not have any red flags other than the night time awakening. Will sent to GI if sx not resolving. This note will not be viewable in [address]

## 2018-01-31 NOTE — PROGRESS NOTES
Chief Complaint   Patient presents with    Abdominal Pain    Nausea    Gas    Constipation     Pt c/o the above symptoms X 1 month. Pt has tried OTC medications with little relief. Pt symptoms are affecting sleep patterns.

## 2018-01-31 NOTE — PATIENT INSTRUCTIONS
Abdominal Pain: Care Instructions  Your Care Instructions    Abdominal pain has many possible causes. Some aren't serious and get better on their own in a few days. Others need more testing and treatment. If your pain continues or gets worse, you need to be rechecked and may need more tests to find out what is wrong. You may need surgery to correct the problem. Don't ignore new symptoms, such as fever, nausea and vomiting, urination problems, pain that gets worse, and dizziness. These may be signs of a more serious problem. Your doctor may have recommended a follow-up visit in the next 8 to 12 hours. If you are not getting better, you may need more tests or treatment. The doctor has checked you carefully, but problems can develop later. If you notice any problems or new symptoms, get medical treatment right away. Follow-up care is a key part of your treatment and safety. Be sure to make and go to all appointments, and call your doctor if you are having problems. It's also a good idea to know your test results and keep a list of the medicines you take. How can you care for yourself at home? · Rest until you feel better. · To prevent dehydration, drink plenty of fluids, enough so that your urine is light yellow or clear like water. Choose water and other caffeine-free clear liquids until you feel better. If you have kidney, heart, or liver disease and have to limit fluids, talk with your doctor before you increase the amount of fluids you drink. · If your stomach is upset, eat mild foods, such as rice, dry toast or crackers, bananas, and applesauce. Try eating several small meals instead of two or three large ones. · Wait until 48 hours after all symptoms have gone away before you have spicy foods, alcohol, and drinks that contain caffeine. · Do not eat foods that are high in fat. · Avoid anti-inflammatory medicines such as aspirin, ibuprofen (Advil, Motrin), and naproxen (Aleve).  These can cause stomach upset. Talk to your doctor if you take daily aspirin for another health problem. When should you call for help? Call 911 anytime you think you may need emergency care. For example, call if:  ? · You passed out (lost consciousness). ? · You pass maroon or very bloody stools. ? · You vomit blood or what looks like coffee grounds. ? · You have new, severe belly pain. ?Call your doctor now or seek immediate medical care if:  ? · Your pain gets worse, especially if it becomes focused in one area of your belly. ? · You have a new or higher fever. ? · Your stools are black and look like tar, or they have streaks of blood. ? · You have unexpected vaginal bleeding. ? · You have symptoms of a urinary tract infection. These may include:  ¨ Pain when you urinate. ¨ Urinating more often than usual.  ¨ Blood in your urine. ? · You are dizzy or lightheaded, or you feel like you may faint. ? Watch closely for changes in your health, and be sure to contact your doctor if:  ? · You are not getting better after 1 day (24 hours). Where can you learn more? Go to http://navaAlgaeonjaime.info/. Enter E358 in the search box to learn more about \"Abdominal Pain: Care Instructions. \"  Current as of: March 20, 2017  Content Version: 11.4  © 6984-3575 [employer]. Care instructions adapted under license by [employer] (which disclaims liability or warranty for this information). If you have questions about a medical condition or this instruction, always ask your healthcare professional. [place] [address] any warranty or liability for your use of this information. Hyoscyamine (By mouth)   Hyoscyamine (wdi-wp-CFL-a-meen)  Reduces muscle activity, including muscle spasms in the digestive system. Dries and reduces secretions, such as reducing acid in the stomach. May treat allergy symptoms and several other conditions.    Brand Name(s): Anaspaz, Ed-Spaz, Hyosyne, Levbid, Levsin, Levsin/SL, NuLev, Oscimin, Oscimin-SR, Symax DuoTab, Symax FasTabs, Symax-SL, Symax-SR   There may be other brand names for this medicine. When This Medicine Should Not Be Used: This medicine is not right for everyone. Do not use it if you had an allergic reaction to hyoscyamine. How to Use This Medicine:   Tablet, Spray, Chewable Tablet, Dissolving Tablet, Long Acting Tablet, Long Acting Capsule, Liquid  · Your doctor will tell you how much medicine to use. Do not use more than directed. · Measure the oral liquid medicine with a marked measuring spoon, oral syringe, or medicine cup. If the medicine came with a dropper, use the dropper to measure each dose. · Make sure your hands are dry before you handle the disintegrating tablet. Peel back the foil from the blister pack, then remove the tablet. Do not push the tablet through the foil. Place the tablet in your mouth. After it has melted, swallow or take a drink of water. · Swallow the extended-release tablet whole. Do not crush, break, or chew it. · Tablet: You might need to take the regular tablet about 30 minutes to 1 hour before you eat a meal. Ask your pharmacist about your specific brand. · Missed dose: Take a dose as soon as you remember. If it is almost time for your next dose, wait until then and take a regular dose. Do not take extra medicine to make up for a missed dose. · Store the medicine in a closed container at room temperature, away from heat, moisture, and direct light. Drugs and Foods to Avoid:   Ask your doctor or pharmacist before using any other medicine, including over-the-counter medicines, vitamins, and herbal products. · Some foods and medicines can affect how hyoscyamine works.  Tell your doctor if you are also using any of the following:   ¨ Amantadine, ketoconazole, haloperidol, metoclopramide, potassium supplement  ¨ Antihistamine  ¨ Narcotic pain medicine  ¨ Phenothiazine medicine, including chlorpromazine, perphenazine, promethazine, prochlorperazine, thioridazine  ¨ MAO inhibitor  ¨ Tricyclic antidepressant  ¨ Medicine to treat or prevent diarrhea  · If you take an antacid, do not take it at the same time you take hyoscyamine. Take your hyoscyamine before a meal and then the antacid after the meal.  Warnings While Using This Medicine:   · Tell your doctor if you are pregnant or breastfeeding, or if you have glaucoma, trouble urinating, myasthenia gravis, overactive thyroid, kidney disease, high blood pressure, heart rhythm problems, heart disease, or autonomic neuropathy. Tell your doctor about all digestion problems, including colitis, reflux disease (GERD), blocked intestines, or gastric ulcer. · This medicine may cause you to sweat less and overheat. Avoid hot temperatures. · This medicine may make you dizzy or drowsy or give you blurred vision. Do not drive or do anything else that could be dangerous until you know how this medicine affects you. · Keep all medicine out of the reach of children. Never share your medicine with anyone. Possible Side Effects While Using This Medicine:   Call your doctor right away if you notice any of these side effects:  · Allergic reaction: Itching or hives, swelling in your face or hands, swelling or tingling in your mouth or throat, chest tightness, trouble breathing  · Blurred vision that does not go away  · Fast heartbeat, dizziness  · Unusual behavior, such as confusion, memory loss, anxiety, trouble sleeping, or hallucinations  If you notice these less serious side effects, talk with your doctor:   · Dry mouth  If you notice other side effects that you think are caused by this medicine, tell your doctor. Call your doctor for medical advice about side effects.  You may report side effects to FDA at 5-660-FDA-8726  © 2017 [address] Information is for End User's use only and may not be sold, redistributed or otherwise used for commercial purposes. The above information is an  only. It is not intended as medical advice for individual conditions or treatments. Talk to your doctor, nurse or pharmacist before following any medical regimen to see if it is safe and effective for you.

## 2018-02-09 ENCOUNTER — TELEPHONE (OUTPATIENT)
Dept: INTERNAL MEDICINE CLINIC | Age: 43
End: 2018-02-09

## 2018-02-09 NOTE — TELEPHONE ENCOUNTER
Patient needs to speak with nurse to get some for her sinus she is leaving Sunday for [place] her no is [phone]

## 2018-05-15 RX ORDER — ONDANSETRON 4 MG/1
TABLET, ORALLY DISINTEGRATING ORAL
Qty: 30 TAB | Refills: 0 | Status: SHIPPED | OUTPATIENT
Start: 2018-05-15 | End: 2020-01-03 | Stop reason: SDUPTHER

## 2018-11-14 DIAGNOSIS — F33.8 SEASONAL AFFECTIVE DISORDER (HCC): ICD-10-CM

## 2018-11-14 RX ORDER — BUPROPION HYDROCHLORIDE 150 MG/1
150 TABLET, EXTENDED RELEASE ORAL DAILY
Qty: 90 TAB | Refills: 3 | Status: SHIPPED | OUTPATIENT
Start: 2018-11-14 | End: 2019-04-30 | Stop reason: ALTCHOICE

## 2019-04-24 ENCOUNTER — TELEPHONE (OUTPATIENT)
Dept: INTERNAL MEDICINE CLINIC | Age: 44
End: 2019-04-24

## 2019-04-24 NOTE — TELEPHONE ENCOUNTER
Patient scheduled CPE for 4/30/19 and states she is overdue for a mammogram. Would like to have this scheduled soon. Thanks.     Best contact: [phone]

## 2019-04-30 ENCOUNTER — OFFICE VISIT (OUTPATIENT)
Dept: INTERNAL MEDICINE CLINIC | Age: 44
End: 2019-04-30

## 2019-04-30 VITALS
HEIGHT: 67 IN | OXYGEN SATURATION: 99 % | BODY MASS INDEX: 18.99 KG/M2 | TEMPERATURE: 97.7 F | SYSTOLIC BLOOD PRESSURE: 116 MMHG | HEART RATE: 80 BPM | DIASTOLIC BLOOD PRESSURE: 78 MMHG | RESPIRATION RATE: 12 BRPM | WEIGHT: 121 LBS

## 2019-04-30 DIAGNOSIS — Z12.4 CERVICAL CANCER SCREENING: ICD-10-CM

## 2019-04-30 DIAGNOSIS — Z00.00 WELL ADULT EXAM: Primary | ICD-10-CM

## 2019-04-30 DIAGNOSIS — F43.9 SITUATIONAL STRESS: ICD-10-CM

## 2019-04-30 NOTE — PATIENT INSTRUCTIONS

## 2019-05-01 LAB
ALBUMIN SERPL-MCNC: 4.4 G/DL (ref 3.5–5.5)
ALBUMIN/GLOB SERPL: 1.8 {RATIO} (ref 1.2–2.2)
ALP SERPL-CCNC: 51 IU/L (ref 39–117)
ALT SERPL-CCNC: 11 IU/L (ref 0–32)
AST SERPL-CCNC: 14 IU/L (ref 0–40)
BILIRUB SERPL-MCNC: 0.3 MG/DL (ref 0–1.2)
BUN SERPL-MCNC: 18 MG/DL (ref 6–24)
BUN/CREAT SERPL: 22 (ref 9–23)
CALCIUM SERPL-MCNC: 9.2 MG/DL (ref 8.7–10.2)
CHLORIDE SERPL-SCNC: 104 MMOL/L (ref 96–106)
CHOLEST SERPL-MCNC: 128 MG/DL (ref 100–199)
CO2 SERPL-SCNC: 25 MMOL/L (ref 20–29)
CREAT SERPL-MCNC: 0.81 MG/DL (ref 0.57–1)
GLOBULIN SER CALC-MCNC: 2.5 G/DL (ref 1.5–4.5)
GLUCOSE SERPL-MCNC: 63 MG/DL (ref 65–99)
HDLC SERPL-MCNC: 51 MG/DL
INTERPRETATION, 910389: NORMAL
LDLC SERPL CALC-MCNC: 63 MG/DL (ref 0–99)
POTASSIUM SERPL-SCNC: 4 MMOL/L (ref 3.5–5.2)
PROT SERPL-MCNC: 6.9 G/DL (ref 6–8.5)
SODIUM SERPL-SCNC: 143 MMOL/L (ref 134–144)
TRIGL SERPL-MCNC: 69 MG/DL (ref 0–149)
TSH SERPL DL<=0.005 MIU/L-ACNC: 0.95 UIU/ML (ref 0.45–4.5)
VLDLC SERPL CALC-MCNC: 14 MG/DL (ref 5–40)

## 2019-05-06 ENCOUNTER — TELEPHONE (OUTPATIENT)
Dept: INTERNAL MEDICINE CLINIC | Age: 44
End: 2019-05-06

## 2019-05-06 NOTE — TELEPHONE ENCOUNTER
----- Message from Gladys Ortiz sent at 5/6/2019 12:19 PM EDT -----  Regarding: Dr. Liang Brizuela with ([facility]) is requesting a call back regarding they received a sample for a pap smear. Are they suppose to run the lab results. Best contact is [phone].

## 2019-05-07 ENCOUNTER — TELEPHONE (OUTPATIENT)
Dept: INTERNAL MEDICINE CLINIC | Age: 44
End: 2019-05-07

## 2019-05-07 LAB
CONTAINER TYPE, CNTN: NORMAL
FINAL RESOLUTION: NORMAL
QUESTION/PROBLEM: NORMAL
STATUS: NORMAL

## 2019-05-07 NOTE — TELEPHONE ENCOUNTER
Received call from Ayah Brown at [address]. States they received the patients pap sample. Per my request, she is forwarding the sample to [facility] to be tested.

## 2019-05-08 DIAGNOSIS — F33.8 SEASONAL AFFECTIVE DISORDER (HCC): ICD-10-CM

## 2019-05-08 RX ORDER — BUPROPION HYDROCHLORIDE 150 MG/1
TABLET, EXTENDED RELEASE ORAL
Qty: 90 TAB | Refills: 1 | Status: SHIPPED | OUTPATIENT
Start: 2019-05-08 | End: 2019-08-07 | Stop reason: ALTCHOICE

## 2019-05-13 NOTE — PROGRESS NOTES
David Solo, can you please check to see if patient's sample went to [facility]? I did not know she required a [facility] lab. She may need request for her labs? Please let me know when you get a chance.   Thanks

## 2019-05-14 ENCOUNTER — HOSPITAL ENCOUNTER (OUTPATIENT)
Dept: MAMMOGRAPHY | Age: 44
Discharge: HOME | End: 2019-05-14
Attending: INTERNAL MEDICINE
Payer: OTHER GOVERNMENT

## 2019-05-14 DIAGNOSIS — Z00.00 WELL ADULT EXAM: ICD-10-CM

## 2019-05-14 PROCEDURE — 77063 BREAST TOMOSYNTHESIS BI: CPT

## 2019-06-03 ENCOUNTER — OFFICE VISIT (OUTPATIENT)
Dept: INTERNAL MEDICINE CLINIC | Age: 44
End: 2019-06-03

## 2019-06-03 VITALS
RESPIRATION RATE: 18 BRPM | OXYGEN SATURATION: 99 % | BODY MASS INDEX: 19.15 KG/M2 | HEART RATE: 58 BPM | DIASTOLIC BLOOD PRESSURE: 84 MMHG | TEMPERATURE: 97.8 F | SYSTOLIC BLOOD PRESSURE: 133 MMHG | HEIGHT: 67 IN | WEIGHT: 122 LBS

## 2019-06-03 DIAGNOSIS — F43.9 SITUATIONAL STRESS: ICD-10-CM

## 2019-06-03 DIAGNOSIS — Z12.4 ENCOUNTER FOR REPEAT PAPANICOLAOU SMEAR OF CERVIX: Primary | ICD-10-CM

## 2019-06-03 RX ORDER — BUSPIRONE HYDROCHLORIDE 5 MG/1
5 TABLET ORAL
Qty: 30 TAB | Refills: 1 | Status: SHIPPED | OUTPATIENT
Start: 2019-06-03 | End: 2019-08-04 | Stop reason: SDUPTHER

## 2019-06-03 NOTE — PROGRESS NOTES
Chief Complaint   Patient presents with   Lottie Chris Exam     pap     Encounter for repeat Pap smear  Patient presents for repeat Pap smear. Per chart review patient's Pap was sent to [facility] as this is her preferred lab provider with her healthcare company. [facility] did not process her Pap and [facility] did not process either. We are repeating today as she is due for her Pap. Patient has an IUD. It was placed 3 years ago. She denies any abnormal vaginal bleeding or discharge. Situational stress  Patient is currently  from her . Patient has had some new revelations and prefers to stay  for now. She is currently working with [facility] in [place] as there prep program coordinate of her diabetes and other health. Patient is requesting Xanax for as needed use. She reports she does have stress related to her marriage. Past Medical History:   Diagnosis Date    Allergic rhinitis, seasonal     Depression     HX OTHER MEDICAL 2000    Pneumothorax    Pneumothorax, spontaneous, tension 56    26 yo (2001 and 2002), right sided     Past Surgical History:   Procedure Laterality Date    HX BREAST AUGMENTATION  10/2012    HX HEENT  2/2015    nasal     HX OTHER SURGICAL  2000    Pneumothroax    IMPLANT BREAST SILICONE/EQ Bilateral 6196     Social History     Socioeconomic History    Marital status:      Spouse name: Not on file    Number of children: Not on file    Years of education: Not on file    Highest education level: Not on file   Occupational History    Occupation: RN   Tobacco Use    Smoking status: Never Smoker    Smokeless tobacco: Never Used   Substance and Sexual Activity    Alcohol use: Yes     Comment: 6-8 glasses of wine per week    Drug use: No    Sexual activity: Yes     Partners: Male   Social History Narrative    Pt is at [employer]. She will be playing up in [employer].          [employer], Prep program for coordinator for diabetes    donte to [employer]    Will move to larger role     Job shares only nurse position    Rep for Skin care and Home improvement projects, painting, pulled up carpet             bad knee surgery, achilles    Daughter 7 yo    Son 9     healthy     Family History   Problem Relation Age of Onset    Heart Disease Other         Paternal Side of Family    Other Maternal Grandfather         Aneurysm    Stroke Maternal Grandfather     Stroke Maternal Grandmother     Heart Disease Paternal Grandmother     Cancer Paternal Grandfather         lung    Other Mother 72        osteoporosis    Thyroid Disease Father     Breast Cancer Neg Hx      Current Outpatient Medications   Medication Sig Dispense Refill    busPIRone (BUSPAR) 5 mg tablet Take 1 Tab by mouth nightly as needed for Other (sleep). 30 Tab 1    Cholecalciferol, Vitamin D3, (VITAMIN D3) 1,000 unit cap Take  by mouth daily.  buPROPion SR (WELLBUTRIN SR) 150 mg SR tablet TAKE 1 TABLET BY MOUTH EVERY DAY 90 Tab 1    ondansetron (ZOFRAN ODT) 4 mg disintegrating tablet TAKE 1 TAB BY MOUTH EVERY EIGHT (8) HOURS AS NEEDED FOR NAUSEA. 30 Tab 0    hyoscyamine sulfate (NULEV) 0.125 mg tablet Take 1 Tab by mouth every four (4) hours as needed. 15 Tab 0    albuterol (PROVENTIL HFA, VENTOLIN HFA, PROAIR HFA) 90 mcg/actuation inhaler Take 2 Puffs by inhalation every four (4) hours as needed for Wheezing. 1 Inhaler 1    cetirizine (ZYRTEC) 10 mg tablet Take  by mouth.  estradiol (ESTRACE) 0.01 % (0.1 mg/gram) vaginal cream Apply with cotton swab once three times weekly.  (Patient not taking: Reported on 1/31/2018) 42.5 g 0     No Known Allergies    Review of Systems - General ROS: negative for - chills, fatigue, fever, hot flashes, malaise, night sweats or sleep disturbance  Cardiovascular ROS: no chest pain or dyspnea on exertion  Respiratory ROS: no cough, shortness of breath, or wheezing    Visit Vitals  /84 (BP 1 Location: Left arm, BP Patient Position: Sitting)   Pulse (!) 58   Temp 97.8 °F (36.6 °C) (Oral)   Resp 18   Ht 5' 7\" (1.702 m)   Wt 122 lb (55.3 kg)   SpO2 99%   BMI 19.11 kg/m²     General Appearance:  Well developed, well nourished,alert and oriented x 3, and individual in no acute distress. Ears/Nose/Mouth/Throat:   Hearing grossly normal.         Neck: Supple, no lad, no bruits   Chest:   Lungs clear to auscultation bilaterally. Cardiovascular:  Regular rate and rhythm, S1, S2 normal, no murmur. Abdomen:   Soft, non-tender, bowel sounds are active. Extremities: No edema bilaterally. Skin: Warm and dry, no suspicious lesions                 Diagnoses and all orders for this visit:    1. Encounter for repeat Papanicolaou smear of cervix  There was apparently a miscommunication with regards to [address] . I redid her Pap  -     PAP IG, APTIMA HPV AND RFX 16/18,45 ([id number])    2. Situational stress  I discussed the use of BuSpar with patient instead of Xanax. She can try half tablet and increase to 1 tablet if needed. Further she can increase to 10 mg if needed in the evening. She may also use 2.5 to 5 mg for as needed daytime use if needed. Patient can try to see if she has any side effects    -     busPIRone (BUSPAR) 5 mg tablet; Take 1 Tab by mouth nightly as needed for Other (sleep). I deferred from using a benzo with this patient at this time.   If it is not working even at a higher dose may consider clonazepam.

## 2019-06-10 LAB
CYTOLOGIST CVX/VAG CYTO: NORMAL
CYTOLOGY CVX/VAG DOC CYTO: NORMAL
CYTOLOGY CVX/VAG DOC THIN PREP: NORMAL
DX ICD CODE: NORMAL
HPV I/H RISK 4 DNA CVX QL PROBE+SIG AMP: NEGATIVE
Lab: NORMAL
Lab: NORMAL
OTHER STN SPEC: NORMAL
STAT OF ADQ CVX/VAG CYTO-IMP: NORMAL

## 2019-08-04 DIAGNOSIS — F43.9 SITUATIONAL STRESS: ICD-10-CM

## 2019-08-04 RX ORDER — BUSPIRONE HYDROCHLORIDE 5 MG/1
5 TABLET ORAL
Qty: 30 TAB | Refills: 1 | Status: SHIPPED | OUTPATIENT
Start: 2019-08-04 | End: 2020-05-17

## 2019-08-07 ENCOUNTER — OFFICE VISIT (OUTPATIENT)
Dept: INTERNAL MEDICINE CLINIC | Age: 44
End: 2019-08-07

## 2019-08-07 VITALS
HEIGHT: 67 IN | WEIGHT: 121 LBS | BODY MASS INDEX: 18.99 KG/M2 | SYSTOLIC BLOOD PRESSURE: 122 MMHG | TEMPERATURE: 98.1 F | HEART RATE: 68 BPM | DIASTOLIC BLOOD PRESSURE: 83 MMHG | OXYGEN SATURATION: 99 % | RESPIRATION RATE: 12 BRPM

## 2019-08-07 DIAGNOSIS — L23.7 POISON IVY DERMATITIS: Primary | ICD-10-CM

## 2019-08-07 DIAGNOSIS — F43.9 SITUATIONAL STRESS: ICD-10-CM

## 2019-08-07 RX ORDER — HYDROXYZINE 25 MG/1
TABLET, FILM COATED ORAL
Qty: 30 TAB | Refills: 0 | Status: SHIPPED | OUTPATIENT
Start: 2019-08-07 | End: 2020-01-16 | Stop reason: ALTCHOICE

## 2019-08-07 RX ORDER — PREDNISONE 20 MG/1
TABLET ORAL
Qty: 10 TAB | Refills: 0 | Status: SHIPPED | OUTPATIENT
Start: 2019-08-07 | End: 2020-01-16 | Stop reason: ALTCHOICE

## 2019-08-07 NOTE — PROGRESS NOTES
Chief Complaint   Patient presents with    Poison Ivy/Poison Oak/Poison Sumac Exposure     onset 2 weeks, on legs, abd, and hands       Poison Ivy/Poison Oak/Poison Sumac Exposure  Patient presents for evaluation of rash on abdomen, lower legsright. Onset of symptoms was sudden, and has been unchanged since that time. Symptoms include itching and blisters. Care prior to arrival consisted of OTC ointment, with minimal relief. Patient reports her symptoms have been going on for 2 weeks. She has a few areas that are erupting again. Situational stress  Patient is undergoing divorce with her . She is also at a higher level of tennis than her peers. 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 She uses BuSpar as needed. Past Medical History:   Diagnosis Date    Allergic rhinitis, seasonal     Depression     HX OTHER MEDICAL 2000    Pneumothorax    Pneumothorax, spontaneous, tension 58    26 yo (2001 and 2002), right sided     Past Surgical History:   Procedure Laterality Date    HX BREAST AUGMENTATION  10/2012    HX HEENT  2/2015    nasal     HX OTHER SURGICAL  2000    Pneumothroax    IMPLANT BREAST SILICONE/EQ Bilateral 3687     Social History     Socioeconomic History    Marital status:      Spouse name: Not on file    Number of children: Not on file    Years of education: Not on file    Highest education level: Not on file   Occupational History    Occupation: RN   Tobacco Use    Smoking status: Never Smoker    Smokeless tobacco: Never Used   Substance and Sexual Activity    Alcohol use: Yes     Comment: 6-8 glasses of wine per week    Drug use: No    Sexual activity: Yes     Partners: Male   Social History Narrative    Pt is at [employer]. She will be playing up in [place], [employer].          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, painting, pulled up carpet             bad knee surgery, achilles    Daughter 9 yo    Son 7     healthy     Family History   Problem Relation Age of Onset    Heart Disease Other         Paternal Side of Family    Other Maternal Grandfather         Aneurysm    Stroke Maternal Grandfather     Stroke Maternal Grandmother     Heart Disease Paternal Grandmother     Cancer Paternal Grandfather         lung    Other Mother 72        osteoporosis    Thyroid Disease Father     Breast Cancer Neg Hx      Current Outpatient Medications   Medication Sig Dispense Refill    busPIRone (BUSPAR) 5 mg tablet TAKE 1 TAB BY MOUTH NIGHTLY AS NEEDED FOR OTHER (SLEEP). 30 Tab 1    buPROPion SR (WELLBUTRIN SR) 150 mg SR tablet TAKE 1 TABLET BY MOUTH EVERY DAY 90 Tab 1    ondansetron (ZOFRAN ODT) 4 mg disintegrating tablet TAKE 1 TAB BY MOUTH EVERY EIGHT (8) HOURS AS NEEDED FOR NAUSEA. 30 Tab 0    hyoscyamine sulfate (NULEV) 0.125 mg tablet Take 1 Tab by mouth every four (4) hours as needed. 15 Tab 0    albuterol (PROVENTIL HFA, VENTOLIN HFA, PROAIR HFA) 90 mcg/actuation inhaler Take 2 Puffs by inhalation every four (4) hours as needed for Wheezing. 1 Inhaler 1    Cholecalciferol, Vitamin D3, (VITAMIN D3) 1,000 unit cap Take  by mouth daily.  cetirizine (ZYRTEC) 10 mg tablet Take  by mouth.  estradiol (ESTRACE) 0.01 % (0.1 mg/gram) vaginal cream Apply with cotton swab once three times weekly.  (Patient not taking: Reported on 1/31/2018) 42.5 g 0     No Known Allergies    Review of Systems - General ROS: negative for - chills, fatigue, fever or hot flashes  Cardiovascular ROS: no chest pain or dyspnea on exertion  Respiratory ROS: no cough, shortness of breath, or wheezing    Visit Vitals  /83 (BP 1 Location: Left arm, BP Patient Position: Sitting)   Pulse 68   Temp 98.1 °F (36.7 °C) (Oral)   Resp 12   Ht 5' 7\" (1.702 m)   Wt 121 lb (54.9 kg)   SpO2 99% BMI 18.95 kg/m²     General Appearance:  Well developed, well nourished,alert and oriented x 3, and individual in no acute distress. Ears/Nose/Mouth/Throat:   Hearing grossly normal.         Neck: Supple, no lad, no bruits   Chest:   Lungs clear to auscultation bilaterally. Cardiovascular:  Regular rate and rhythm, S1, S2 normal, no murmur. Abdomen:   Soft, non-tender, bowel sounds are active. Healing blisters on abdomen scattered freckles   Extremities: No edema bilaterally. Right lateral leg hematoma, 2 parallel linear lines with papules,    Skin: Warm and dry, no suspicious lesions                 Diagnoses and all orders for this visit:    1. Poison ivy dermatitis  Patient with persistent symptoms  No relief with over-the-counter hydrocortisone  -     predniSONE (DELTASONE) 20 mg tablet; Take 2 tab po daily x 2 days then 1 tab for 2 days then decrease to 1/2 tab for 3 days  -     hydrOXYzine HCl (ATARAX) 25 mg tablet; Take 1-2 po at night prn pruritis caution sedation. 2. Situational stress  Patient may use BuSpar as needed. She can also use hydroxyzine in the evening as needed for sleep if anxiety increases. Currently she appears to be in a very good place and is sorting a lot of issues out. She has the support of a very good counselor.

## 2020-01-03 RX ORDER — ONDANSETRON 4 MG/1
4 TABLET, ORALLY DISINTEGRATING ORAL
Qty: 30 TAB | Refills: 0 | Status: SHIPPED | OUTPATIENT
Start: 2020-01-03 | End: 2022-01-14 | Stop reason: ALTCHOICE

## 2020-01-16 ENCOUNTER — OFFICE VISIT (OUTPATIENT)
Dept: INTERNAL MEDICINE CLINIC | Age: 45
End: 2020-01-16

## 2020-01-16 ENCOUNTER — HOSPITAL ENCOUNTER (OUTPATIENT)
Dept: LAB | Age: 45
Discharge: HOME | End: 2020-01-16

## 2020-01-16 VITALS
DIASTOLIC BLOOD PRESSURE: 83 MMHG | OXYGEN SATURATION: 100 % | SYSTOLIC BLOOD PRESSURE: 122 MMHG | HEIGHT: 67 IN | WEIGHT: 123.8 LBS | TEMPERATURE: 98.2 F | BODY MASS INDEX: 19.43 KG/M2 | RESPIRATION RATE: 12 BRPM | HEART RATE: 65 BPM

## 2020-01-16 DIAGNOSIS — R10.30 LOWER ABDOMINAL PAIN: Primary | ICD-10-CM

## 2020-01-16 DIAGNOSIS — K29.00 OTHER ACUTE GASTRITIS, PRESENCE OF BLEEDING UNSPECIFIED: ICD-10-CM

## 2020-01-16 LAB
ALBUMIN SERPL-MCNC: 4.3 G/DL (ref 3.5–5)
ALBUMIN/GLOB SERPL: 1.4 {RATIO} (ref 1.1–2.2)
ALP SERPL-CCNC: 51 U/L (ref 45–117)
ALT SERPL-CCNC: 16 U/L (ref 12–78)
ANION GAP SERPL CALC-SCNC: 4 MMOL/L (ref 5–15)
AST SERPL-CCNC: 10 U/L (ref 15–37)
BASOPHILS # BLD: 0 K/UL (ref 0–0.1)
BASOPHILS NFR BLD: 1 % (ref 0–1)
BILIRUB SERPL-MCNC: 0.6 MG/DL (ref 0.2–1)
BILIRUB UR QL STRIP: NEGATIVE
BUN SERPL-MCNC: 22 MG/DL (ref 6–20)
BUN/CREAT SERPL: 21 (ref 12–20)
CALCIUM SERPL-MCNC: 9.2 MG/DL (ref 8.5–10.1)
CHLORIDE SERPL-SCNC: 105 MMOL/L (ref 97–108)
CO2 SERPL-SCNC: 29 MMOL/L (ref 21–32)
CREAT SERPL-MCNC: 1.05 MG/DL (ref 0.55–1.02)
DIFFERENTIAL METHOD BLD: ABNORMAL
EOSINOPHIL # BLD: 0.3 K/UL (ref 0–0.4)
EOSINOPHIL NFR BLD: 4 % (ref 0–7)
ERYTHROCYTE [DISTWIDTH] IN BLOOD BY AUTOMATED COUNT: 11.9 % (ref 11.5–14.5)
GLOBULIN SER CALC-MCNC: 3.1 G/DL (ref 2–4)
GLUCOSE SERPL-MCNC: 84 MG/DL (ref 65–100)
GLUCOSE UR-MCNC: NEGATIVE MG/DL
HCT VFR BLD AUTO: 42.1 % (ref 35–47)
HGB BLD-MCNC: 13.9 G/DL (ref 11.5–16)
IMM GRANULOCYTES # BLD AUTO: 0 K/UL (ref 0–0.04)
IMM GRANULOCYTES NFR BLD AUTO: 1 % (ref 0–0.5)
KETONES P FAST UR STRIP-MCNC: NEGATIVE MG/DL
LYMPHOCYTES # BLD: 1.5 K/UL (ref 0.8–3.5)
LYMPHOCYTES NFR BLD: 18 % (ref 12–49)
MCH RBC QN AUTO: 31.2 PG (ref 26–34)
MCHC RBC AUTO-ENTMCNC: 33 G/DL (ref 30–36.5)
MCV RBC AUTO: 94.4 FL (ref 80–99)
MONOCYTES # BLD: 0.6 K/UL (ref 0–1)
MONOCYTES NFR BLD: 7 % (ref 5–13)
NEUTS SEG # BLD: 5.9 K/UL (ref 1.8–8)
NEUTS SEG NFR BLD: 69 % (ref 32–75)
NRBC # BLD: 0 K/UL (ref 0–0.01)
NRBC BLD-RTO: 0 PER 100 WBC
PH UR STRIP: 6 [PH] (ref 4.6–8)
PLATELET # BLD AUTO: 190 K/UL (ref 150–400)
PMV BLD AUTO: 11.8 FL (ref 8.9–12.9)
POTASSIUM SERPL-SCNC: 4.2 MMOL/L (ref 3.5–5.1)
PROT SERPL-MCNC: 7.4 G/DL (ref 6.4–8.2)
PROT UR QL STRIP: NEGATIVE
RBC # BLD AUTO: 4.46 M/UL (ref 3.8–5.2)
SODIUM SERPL-SCNC: 138 MMOL/L (ref 136–145)
SP GR UR STRIP: 1.01 (ref 1–1.03)
UA UROBILINOGEN AMB POC: ABNORMAL (ref 0.2–1)
URINALYSIS CLARITY POC: CLEAR
URINALYSIS COLOR POC: YELLOW
URINE BLOOD POC: ABNORMAL
URINE LEUKOCYTES POC: NEGATIVE
URINE NITRITES POC: NEGATIVE
WBC # BLD AUTO: 8.3 K/UL (ref 3.6–11)

## 2020-01-16 RX ORDER — SUCRALFATE 1 G/1
1 TABLET ORAL 4 TIMES DAILY
Qty: 40 TAB | Refills: 0 | Status: SHIPPED | OUTPATIENT
Start: 2020-01-16 | End: 2020-01-29 | Stop reason: ALTCHOICE

## 2020-01-16 RX ORDER — OMEPRAZOLE 20 MG/1
20 CAPSULE, DELAYED RELEASE ORAL DAILY
Qty: 30 CAP | Refills: 0 | Status: SHIPPED | OUTPATIENT
Start: 2020-01-16 | End: 2020-01-29 | Stop reason: ALTCHOICE

## 2020-01-16 NOTE — PATIENT INSTRUCTIONS
Gastritis: Care Instructions  Your Care Instructions    Gastritis is a sore and upset stomach. It happens when something irritates the stomach lining. Many things can cause it. These include an infection such as the flu or something you ate or drank. Medicines or a sore on the lining of the stomach (ulcer) also can cause it. Your belly may bloat and ache. You may belch, vomit, and feel sick to your stomach. You should be able to relieve the problem by taking medicine. And it may help to change your diet. If gastritis lasts, your doctor may prescribe medicine. Follow-up care is a key part of your treatment and safety. Be sure to make and go to all appointments, and call your doctor if you are having problems. It's also a good idea to know your test results and keep a list of the medicines you take. How can you care for yourself at home? · If your doctor prescribed antibiotics, take them as directed. Do not stop taking them just because you feel better. You need to take the full course of antibiotics. · Be safe with medicines. If your doctor prescribed medicine to decrease stomach acid, take it as directed. Call your doctor if you think you are having a problem with your medicine. · Do not take any other medicine, including over-the-counter pain relievers, without talking to your doctor first.  · If your doctor recommends over-the-counter medicine to reduce stomach acid, such as Pepcid AC, Prilosec, Tagamet HB, or Zantac 75, follow the directions on the label. · Drink plenty of fluids (enough so that your urine is light yellow or clear like water) to prevent dehydration. Choose water and other caffeine-free clear liquids. If you have kidney, heart, or liver disease and have to limit fluids, talk with your doctor before you increase the amount of fluids you drink. · Limit how much alcohol you drink. · Avoid coffee, tea, cola drinks, chocolate, and other foods with caffeine.  They increase stomach acid.  When should you call for help? Call 911 anytime you think you may need emergency care. For example, call if:    · You vomit blood or what looks like coffee grounds.     · You pass maroon or very bloody stools.    Call your doctor now or seek immediate medical care if:    · You start breathing fast and have not produced urine in the last 8 hours.     · You cannot keep fluids down.    Watch closely for changes in your health, and be sure to contact your doctor if:    · You do not get better as expected. Where can you learn more? Go to http://nava-jaime.info/. Enter [id number] in the search box to learn more about \"Gastritis: Care Instructions. \"  Current as of: November 7, 2018  Content Version: 12.2  © 3327-8967 [employer]. Care instructions adapted under license by [employer] (which disclaims liability or warranty for this information). If you have questions about a medical condition or this instruction, always ask your healthcare professional. [address] any warranty or liability for your use of this information.

## 2020-01-17 ENCOUNTER — TELEPHONE (OUTPATIENT)
Dept: INTERNAL MEDICINE CLINIC | Age: 45
End: 2020-01-17

## 2020-01-17 NOTE — TELEPHONE ENCOUNTER
Received page from pt. Seen earlier yesterday for abd pain. Pt called saying that her abd pain acutely worsened over the afternoon and is \"doubled over\" in pain. Had to be driven home from work due to pain. Given acute change, recommend eval at ER for imaging.

## 2020-01-17 NOTE — PROGRESS NOTES
HISTORY OF PRESENT ILLNESS  Naun Cole is a 45 y.o. female. HPI  Patient of Dr Cyndie Jauregui who presents with complaints of epigastric abdominal discomfort with dull pressure sensation and some lower abdominal cramping. Symptoms began 1/12 with some mild nausea and she took Zofran with some relief. Has not vomited but continued to have low level of nausea for the next 2 days. Started to develop some looser stools with mild abdominal cramping for the past 2 days. Denies fever, chills, vomiting, blood in stools. Her boyfriend was recently ill with GI virus. Has been eating and drinking and has not noted any change in symptoms with food. Has taken OTC Pepcid without much relief. Has been able to work without issue. Noted some vaginal spotting over the past 2 days; she has Mirena IUD that has been in place for 4 years and she normally does not have any vaginal bleeding.     Patient Active Problem List   Diagnosis Code    Depression F32.9    Stress incontinence, female N39.3     Past Surgical History:   Procedure Laterality Date    HX BREAST AUGMENTATION  10/2012    HX HEENT  2/2015    nasal     HX OTHER SURGICAL  2000    Pneumothroax    IMPLANT BREAST SILICONE/EQ Bilateral 2986     Social History     Socioeconomic History    Marital status:      Spouse name: Not on file    Number of children: Not on file    Years of education: Not on file    Highest education level: Not on file   Occupational History    Occupation: RN   Social Needs    Financial resource strain: Not on file    Food insecurity:     Worry: Not on file     Inability: Not on file   [employer] needs:     Medical: Not on file     Non-medical: Not on file   Tobacco Use    Smoking status: Never Smoker    Smokeless tobacco: Never Used   Substance and Sexual Activity    Alcohol use: Yes     Comment: 6-8 glasses of wine per week    Drug use: No    Sexual activity: Yes     Partners: Male   Lifestyle    Physical activity:     Days per week: Not on file     Minutes per session: Not on file    Stress: Not on file   Relationships    Social connections:     Talks on phone: Not on file     Gets together: Not on file     Attends Cheondoism service: Not on file     Active member of club or organization: Not on file     Attends meetings of clubs or organizations: Not on file     Relationship status: Not on file    Intimate partner violence:     Fear of current or ex partner: Not on file     Emotionally abused: Not on file     Physically abused: Not on file     Forced sexual activity: Not on file   Other Topics Concern    Not on file   Social History Narrative    Pt is at 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 She will be playing up in [employer]. [employer], Prep program for coordinator for diabetes    martinewesley bahman [place]AN [place]    Will move to larger role     Job shares only nurse position    Rep for Skin care and Home improvement projects, painting, pulled up carpet             bad knee surgery, achilles    Daughter 7 yo    Son 9     healthy     Family History   Problem Relation Age of Onset    Heart Disease Other         Paternal Side of Family    Other Maternal Grandfather         Aneurysm    Stroke Maternal Grandfather     Stroke Maternal Grandmother     Heart Disease Paternal Grandmother     Cancer Paternal Grandfather         lung    Other Mother 72        osteoporosis    Thyroid Disease Father     Breast Cancer Neg Hx      Current Outpatient Medications   Medication Sig    omeprazole (PRILOSEC) 20 mg capsule Take 1 Cap by mouth daily.  sucralfate (CARAFATE) 1 gram tablet Take 1 Tab by mouth four (4) times daily.  ondansetron (ZOFRAN ODT) 4 mg disintegrating tablet Take 1 Tab by mouth every eight (8) hours as needed for Nausea.  busPIRone (BUSPAR) 5 mg tablet TAKE 1 TAB BY MOUTH NIGHTLY AS NEEDED FOR OTHER (SLEEP).     albuterol (PROVENTIL HFA, VENTOLIN HFA, PROAIR HFA) 90 mcg/actuation inhaler Take 2 Puffs by inhalation every four (4) hours as needed for Wheezing. No current facility-administered medications for this visit. No Known Allergies  Immunization History   Administered Date(s) Administered    Hep B Vaccine 01/01/1994    Influenza Vaccine 10/01/2013    TB Skin Test (PPD) 01/01/2011    Tdap 07/17/2015        Review of Systems   Constitutional: Positive for malaise/fatigue. Negative for chills and fever. HENT: Negative for congestion and sore throat. Respiratory: Negative for cough, shortness of breath and wheezing. Cardiovascular: Negative for palpitations. Gastrointestinal: Positive for abdominal pain, diarrhea and nausea. Negative for blood in stool, constipation and vomiting. Genitourinary: Negative for dysuria, frequency and hematuria. Musculoskeletal: Negative for back pain and joint pain. Skin: Negative for rash. Neurological: Negative for dizziness, tingling and headaches. /83 (BP 1 Location: Left arm, BP Patient Position: Sitting)   Pulse 65   Temp 98.2 °F (36.8 °C) (Oral)   Resp 12   Ht 5' 7\" (1.702 m)   Wt 123 lb 12.8 oz (56.2 kg)   SpO2 100%   BMI 19.39 kg/m²   Physical Exam  Vitals signs and nursing note reviewed. Constitutional:       General: She is not in acute distress. Appearance: Normal appearance. HENT:      Head: Normocephalic and atraumatic. Right Ear: Tympanic membrane normal.      Left Ear: Tympanic membrane normal.      Nose: Nose normal.      Mouth/Throat:      Mouth: Mucous membranes are moist.      Pharynx: Oropharynx is clear. Neck:      Musculoskeletal: Normal range of motion and neck supple. Cardiovascular:      Rate and Rhythm: Normal rate and regular rhythm. Pulses: Normal pulses. Heart sounds: Normal heart sounds. Pulmonary:      Effort: Pulmonary effort is normal.      Breath sounds: Normal breath sounds. No wheezing or rhonchi. Abdominal:      General: Abdomen is flat. Bowel sounds are increased. There is no distension. Palpations: Abdomen is soft. Tenderness: There is tenderness in the epigastric area. There is no guarding or rebound. Negative signs include Johnston's sign and McBurney's sign. Musculoskeletal: Normal range of motion. Skin:     General: Skin is warm and dry. Neurological:      General: No focal deficit present. Mental Status: She is alert and oriented to person, place, and time. Results for orders placed or performed in visit on 01/16/20   AMB POC URINALYSIS DIP STICK AUTO W/O MICRO     Status: Abnormal   Result Value Ref Range Status    Color (UA POC) Yellow  Final    Clarity (UA POC) Clear  Final    Glucose (UA POC) Negative Negative Final    Bilirubin (UA POC) Negative Negative Final    Ketones (UA POC) Negative Negative Final    Specific gravity (UA POC) 1.010 1.001 - 1.035 Final    Blood (UA POC) 3+ Negative Final    pH (UA POC) 6.0 4.6 - 8.0 Final    Protein (UA POC) Negative Negative Final    Urobilinogen (UA POC) 0.2 mg/dL 0.2 - 1 Final    Nitrites (UA POC) Negative Negative Final    Leukocyte esterase (UA POC) Negative Negative Final         ASSESSMENT and PLAN  Diagnoses and all orders for this visit:    1. Lower abdominal pain -- urine showed blood but she has been having vaginal spotting.  -     AMB POC URINALYSIS DIP STICK AUTO W/O MICRO    2. Other acute gastritis, presence of bleeding unspecified -- suspect that she has viral gastroenteritis; not showing any signs of acute abdomen. Advised her to seek immediate medical attention if pain becomes more severe. -     CBC WITH AUTOMATED DIFF; Future  -     METABOLIC PANEL, COMPREHENSIVE; Future  -     omeprazole (PRILOSEC) 20 mg capsule; Take 1 Cap by mouth daily. -     sucralfate (CARAFATE) 1 gram tablet; Take 1 Tab by mouth four (4) times daily.       lab results and schedule of future lab studies reviewed with patient  reviewed diet, exercise and weight control  reviewed medications and side effects in detail

## 2020-01-18 NOTE — TELEPHONE ENCOUNTER
Please reach out to pt to see if she went to the ER. If so can we get her results to follow?  Thanks, jimmy

## 2020-01-20 NOTE — TELEPHONE ENCOUNTER
Thanks for reaching out to her. Please have her follow up in about a 1week to check labs, evaluate epigastric pain on omeprazole 20 mg may need 40 mg and see if needs possible upper endoscopy.

## 2020-01-20 NOTE — TELEPHONE ENCOUNTER
Returned call to erika Davis scheduled Wednesday, January 29, 2020 08:20 AM as she is out of town on 1/27-1/28

## 2020-01-20 NOTE — TELEPHONE ENCOUNTER
Spoke to pt, she states she went to [facility] ER on 1/16/2020. abd US was normal. BUN and creat was elevated and she has blood in her urine. Abd CT was normal. She was diagnosed with dehydration and gastritis (in the office that same day). ER doc states she needs to have her labs repeated to see if her BUN and creat has returned to normal, but did not say when. Please advise when pt should return to office for repeat labs. Pt states she is feeling better, taking Omeprazole and Sucralfate.

## 2020-01-29 ENCOUNTER — OFFICE VISIT (OUTPATIENT)
Dept: INTERNAL MEDICINE CLINIC | Age: 45
End: 2020-01-29

## 2020-01-29 VITALS
DIASTOLIC BLOOD PRESSURE: 88 MMHG | HEIGHT: 67 IN | RESPIRATION RATE: 12 BRPM | OXYGEN SATURATION: 100 % | SYSTOLIC BLOOD PRESSURE: 129 MMHG | TEMPERATURE: 97.5 F | WEIGHT: 128 LBS | BODY MASS INDEX: 20.09 KG/M2 | HEART RATE: 59 BPM

## 2020-01-29 DIAGNOSIS — F43.9 SITUATIONAL STRESS: Primary | ICD-10-CM

## 2020-01-29 NOTE — PROGRESS NOTES
Chief Complaint   Patient presents with    Abdominal Pain       Patient presents to follow-up with regards to her abdominal pain. Abdominal pain  Patient reports that her symptoms are significantly improved. She thinks her abdominal pain was related to dehydration. She reports that she was having symptoms and were progressive so saw Irma Jha. She was diagnosed with a viral gastroenteritis as she had a positive contact with her significant other. She reports that after seeing Bobby Salcedo her pain got significantly worse and had to go to the [facility] ER. Her pain was so severe they initially thought that she had gallstones but her imaging and blood work was within normal limits. She was given IV fluids and her symptoms improved. She reports she is close to her baseline now. She is still taking omeprazole but really did not take Carafate. Situational stress  Sleeping average of 7 per night. Well rested when wakes up  She is currently working with the [employer] prep plan  Patient is not on any medication and does not really use BuSpar. She likes to have it on hand if needed. Her divorce is proceeding and her paper should be final in the spring.       Past Medical History:   Diagnosis Date    Allergic rhinitis, seasonal     Depression     HX OTHER MEDICAL 2000    Pneumothorax    Pneumothorax, spontaneous, tension 56    26 yo (2001 and 2002), right sided     Past Surgical History:   Procedure Laterality Date    HX BREAST AUGMENTATION  10/2012    HX HEENT  2/2015    nasal     HX OTHER SURGICAL  2000    Pneumothroax    IMPLANT BREAST SILICONE/EQ Bilateral 6087     Social History     Socioeconomic History    Marital status:      Spouse name: Not on file    Number of children: Not on file    Years of education: Not on file    Highest education level: Not on file   Occupational History    Occupation: RN   Tobacco Use    Smoking status: Never Smoker    Smokeless tobacco: Never Used   Substance and Sexual Activity    Alcohol use: Yes     Comment: 6-8 glasses of wine per week    Drug use: No    Sexual activity: Yes     Partners: Male   Social History Narrative    Pt is at [employer]. She will be playing up in [employer]. 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, painting, pulled up carpet             bad knee surgery, achilles    Daughter 7 yo    Son 9     healthy     Family History   Problem Relation Age of Onset    Heart Disease Other         Paternal Side of Family    Other Maternal Grandfather         Aneurysm    Stroke Maternal Grandfather     Stroke Maternal Grandmother     Heart Disease Paternal Grandmother     Cancer Paternal Grandfather         lung    Other Mother 72        osteoporosis    Thyroid Disease Father     Breast Cancer Neg Hx      Current Outpatient Medications   Medication Sig Dispense Refill    omeprazole (PRILOSEC) 20 mg capsule Take 1 Cap by mouth daily. 30 Cap 0    ondansetron (ZOFRAN ODT) 4 mg disintegrating tablet Take 1 Tab by mouth every eight (8) hours as needed for Nausea. 30 Tab 0    busPIRone (BUSPAR) 5 mg tablet TAKE 1 TAB BY MOUTH NIGHTLY AS NEEDED FOR OTHER (SLEEP). 30 Tab 1    albuterol (PROVENTIL HFA, VENTOLIN HFA, PROAIR HFA) 90 mcg/actuation inhaler Take 2 Puffs by inhalation every four (4) hours as needed for Wheezing. 1 Inhaler 1    sucralfate (CARAFATE) 1 gram tablet Take 1 Tab by mouth four (4) times daily. (Patient taking differently: Take 1 g by mouth four (4) times daily.  Indications: not taking) 40 Tab 0     No Known Allergies    Review of Systems - General ROS: negative for - chills, fatigue, fever or hot flashes  Cardiovascular ROS: no chest pain or dyspnea on exertion  Respiratory ROS: no cough, shortness of breath, or wheezing    Visit Vitals  /88 (BP 1 Location: Left arm, BP Patient Position: Sitting)   Pulse (!) 59   Temp 97.5 °F (36.4 °C) (Oral)   Resp 12   Ht 5' 7\" (1.702 m)   Wt 128 lb (58.1 kg)   SpO2 100%   BMI 20.05 kg/m²     General Appearance:  Well developed, well nourished,alert and oriented x 3, and individual in no acute distress. Ears/Nose/Mouth/Throat:   Hearing grossly normal.         Neck: Supple, no lad, no bruits   Chest:   Lungs clear to auscultation bilaterally. Cardiovascular:  Regular rate and rhythm, S1, S2 normal, no murmur. Abdomen:   Soft, non-tender, bowel sounds are active. Healing blisters on abdomen scattered freckles   Extremities: No edema bilaterally. Right lateral leg hematoma, 2 parallel linear lines with papules,    Skin: Warm and dry, no suspicious lesions                 Diagnoses and all orders for this visit:    Abdominal pain resolved    1. Situational stress   Patient situational stress has significantly improved. She is not on any medication. She is in a good place with a CAC. She will take BuSpar as needed. I do not think she will need very much.     Continue to monitor    Dehydration improved  Patient can discontinue PPI omeprazole but if symptoms restart then consider taking for 2 to 3 months

## 2020-05-17 DIAGNOSIS — F43.9 SITUATIONAL STRESS: ICD-10-CM

## 2020-05-17 RX ORDER — BUSPIRONE HYDROCHLORIDE 5 MG/1
TABLET ORAL
Qty: 30 TAB | Refills: 1 | Status: SHIPPED | OUTPATIENT
Start: 2020-05-17 | End: 2020-07-20

## 2020-07-14 ENCOUNTER — EMPLOYEE WELLNESS (OUTPATIENT)
Dept: OTHER | Facility: CLINIC | Age: 45
End: 2020-07-14

## 2020-07-14 LAB
CHOLEST SERPL-MCNC: 143 MG/DL
GLUCOSE SERPL-MCNC: 81 MG/DL (ref 65–100)
HDLC SERPL-MCNC: 59 MG/DL
LDLC SERPL CALC-MCNC: 68.2 MG/DL (ref 0–100)
TRIGL SERPL-MCNC: 79 MG/DL (ref ?–150)

## 2020-07-20 DIAGNOSIS — F43.9 SITUATIONAL STRESS: ICD-10-CM

## 2020-07-20 RX ORDER — BUSPIRONE HYDROCHLORIDE 5 MG/1
TABLET ORAL
Qty: 30 TAB | Refills: 1 | Status: SHIPPED | OUTPATIENT
Start: 2020-07-20 | End: 2021-06-29 | Stop reason: SDUPTHER

## 2020-07-21 RX ORDER — FLUOXETINE 10 MG/1
10 TABLET ORAL DAILY
Qty: 90 TAB | Refills: 3 | Status: SHIPPED | OUTPATIENT
Start: 2020-07-21 | End: 2021-09-11

## 2020-09-18 DIAGNOSIS — Z11.9 SCREENING EXAMINATION FOR INFECTIOUS DISEASE: Primary | ICD-10-CM

## 2020-09-20 LAB — SARS-COV-2, NAA: NOT DETECTED

## 2020-09-23 RX ORDER — AMOXICILLIN 500 MG/1
500 CAPSULE ORAL 2 TIMES DAILY
Qty: 20 CAP | Refills: 0 | Status: SHIPPED | OUTPATIENT
Start: 2020-09-23 | End: 2020-10-03

## 2020-12-02 RX ORDER — ESCITALOPRAM OXALATE 5 MG/1
5 TABLET ORAL DAILY
Qty: 90 TAB | Refills: 3 | Status: SHIPPED | OUTPATIENT
Start: 2020-12-02 | End: 2022-01-14 | Stop reason: SDUPTHER

## 2020-12-02 NOTE — TELEPHONE ENCOUNTER
Pt called with complaints of generalized anxiety, affecting her quality of life and her sleep. Desires trial of Lexapro. Rx sent and pt will schedule an annual and FU.     Elva Callahan MD

## 2021-06-29 ENCOUNTER — OFFICE VISIT (OUTPATIENT)
Dept: OBGYN CLINIC | Age: 46
End: 2021-06-29
Payer: COMMERCIAL

## 2021-06-29 VITALS — BODY MASS INDEX: 19.78 KG/M2 | HEIGHT: 67 IN | WEIGHT: 126 LBS

## 2021-06-29 DIAGNOSIS — Z30.433 ENCOUNTER FOR IUD REMOVAL AND REINSERTION: ICD-10-CM

## 2021-06-29 DIAGNOSIS — Z01.419 ENCOUNTER FOR ANNUAL ROUTINE GYNECOLOGICAL EXAMINATION: Primary | ICD-10-CM

## 2021-06-29 DIAGNOSIS — F43.9 SITUATIONAL STRESS: ICD-10-CM

## 2021-06-29 PROCEDURE — 58301 REMOVE INTRAUTERINE DEVICE: CPT | Performed by: OBSTETRICS & GYNECOLOGY

## 2021-06-29 PROCEDURE — 58300 INSERT INTRAUTERINE DEVICE: CPT | Performed by: OBSTETRICS & GYNECOLOGY

## 2021-06-29 PROCEDURE — 99386 PREV VISIT NEW AGE 40-64: CPT | Performed by: OBSTETRICS & GYNECOLOGY

## 2021-06-29 RX ORDER — BUSPIRONE HYDROCHLORIDE 5 MG/1
TABLET ORAL
Qty: 90 TABLET | Refills: 3 | Status: SHIPPED | OUTPATIENT
Start: 2021-06-29

## 2021-06-29 RX ORDER — LEVONORGESTREL 52 MG/1
1 INTRAUTERINE DEVICE INTRAUTERINE ONCE
COMMUNITY

## 2021-06-29 NOTE — PROGRESS NOTES
Annual exam    Jane Holcomb is a 42 y.o.  presenting for annual exam. Her main concerns today include wellness screening and replacement of Mirena IUD. No issues with the IUD, just 5 year duration now. Would like a refill of her buspar. Not needing lexapro anymore. Derm - [facility]    She declines a chaperone during the gynecologic exam today.      Ob/Gyn Hx:   -  x 2  Menses - n/a, occ spotting w/ IUD  Sexually active - yes, male  Contraception - Mirena IUD, replaced today    Health maintenance:  Pap - 2019 NIL, HPV neg  Mammo - 21 normal  Colonoscopy - not done      Past Medical History:   Diagnosis Date    Allergic rhinitis, seasonal     Depression     Encounter for removal and reinsertion of IUD 2021    HX OTHER MEDICAL     Pneumothorax    Pneumothorax, spontaneous, tension 2000    26 yo ( and ), right sided       Past Surgical History:   Procedure Laterality Date    HX BREAST AUGMENTATION  10/2012    HX HEENT  2015    nasal     HX OTHER SURGICAL      Pneumothroax    IMPLANT BREAST SILICONE/EQ Bilateral 4595       Family History   Problem Relation Age of Onset    Heart Disease Other         Paternal Side of Family    Other Maternal Grandfather         Aneurysm    Stroke Maternal Grandfather     Stroke Maternal Grandmother     Heart Disease Paternal Grandmother     Cancer Paternal Grandfather         lung    Other Mother 72        osteoporosis    Thyroid Disease Father     Breast Cancer Neg Hx        Social History     Socioeconomic History    Marital status:      Spouse name: Not on file    Number of children: Not on file    Years of education: Not on file    Highest education level: Not on file   Occupational History    Occupation: RN   Tobacco Use    Smoking status: Never Smoker    Smokeless tobacco: Never Used   Vaping Use    Vaping Use: Never used   Substance and Sexual Activity    Alcohol use: Yes     Comment:  glasses of wine per week    Drug use: No    Sexual activity: Yes     Partners: Male     Birth control/protection: I.U.D. Other Topics Concern    Not on file   Social History Narrative    Pt is at [employer]0 [employer] . She will be playing up in [employer]. 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, painting, pulled up carpet             bad knee surgery, achilles    Daughter 9 yo    Son 7     healthy     Social Determinants of Health     Financial Resource Strain:     Difficulty of Paying Living Expenses:    Food Insecurity:     Worried About Running Out of Food in the Last Year:     [address] in the Last Year:    Transportation Needs:     Lack of Transportation (Medical):  Lack of Transportation (Non-Medical):    Physical Activity:     Days of Exercise per Week:     Minutes of Exercise per Session:    Stress:     Feeling of Stress :    Social Connections:     Frequency of Communication with Friends and Family:     Frequency of Social Gatherings with Friends and Family:     Attends Jehovah's witness Services:     Active Member of Clubs or Organizations:     Attends Club or Organization Meetings:     Marital Status:    Intimate Partner Violence:     Fear of Current or Ex-Partner:     Emotionally Abused:     Physically Abused:     Sexually Abused:        Current Outpatient Medications   Medication Sig Dispense Refill    levonorgestreL (Mirena) 20 mcg/24 hours (6 yrs) 52 mg IUD 1 Device by IntraUTERine route once.  busPIRone (BUSPAR) 5 mg tablet TAKE 1 TABLET BY MOUTH EVERY DAY AT BEDTIME AS NEEDED FOR SLEEP 90 Tablet 3    albuterol (PROVENTIL HFA, VENTOLIN HFA, PROAIR HFA) 90 mcg/actuation inhaler Take 2 Puffs by inhalation every four (4) hours as needed for Wheezing.  1 Inhaler 1    escitalopram oxalate (LEXAPRO) 5 mg tablet Take 1 Tab by mouth daily. (Patient not taking: Reported on 6/29/2021) 90 Tab 3    FLUoxetine (PROzac) 10 mg tablet Take 1 Tab by mouth daily. (Patient not taking: Reported on 6/29/2021) 90 Tab 3    ondansetron (ZOFRAN ODT) 4 mg disintegrating tablet Take 1 Tab by mouth every eight (8) hours as needed for Nausea.  (Patient not taking: Reported on 6/29/2021) 30 Tab 0       No Known Allergies    Review of Systems - History obtained from the patient  Constitutional: negative for weight loss, fever, night sweats  HEENT: negative for hearing loss, earache, congestion, snoring, sorethroat  CV: negative for chest pain, palpitations, edema  Resp: negative for cough, shortness of breath, wheezing  GI: negative for change in bowel habits, abdominal pain, black or bloody stools  : negative for frequency, dysuria, hematuria, vaginal discharge  MSK: negative for back pain, joint pain, muscle pain  Breast: negative for breast lumps, nipple discharge, galactorrhea  Skin :negative for itching, rash, hives  Neuro: negative for dizziness, headache, confusion, weakness  Psych: negative for anxiety, depression, change in mood  Heme/lymph: negative for bleeding, bruising, pallor    Physical Exam    Visit Vitals  Ht 5' 7\" (1.702 m)   Wt 126 lb (57.2 kg)   BMI 19.73 kg/m²       Constitutional  · Appearance: well-nourished, well developed, alert, in no acute distress    HENT  · Head and Face: appears normal    Neck  · Inspection/Palpation: normal appearance, no masses or tenderness  · Lymph Nodes: no lymphadenopathy present  · Thyroid: gland size normal, nontender, no nodules or masses present on palpation    Chest  · Respiratory Effort: non-labored breathing  · Auscultation: CTAB, normal breath sounds    Cardiovascular  · Heart:  · Auscultation: regular rate and rhythm without murmur  · Extremities: no peripheral edema    Breasts  · Inspection of Breasts: breasts symmetrical, no skin changes, no discharge present, nipple appearance normal, no skin retraction present  · Palpation of Breasts and Axillae: no masses present on palpation, no breast tenderness, b/l implants palpable  · Axillary Lymph Nodes: no lymphadenopathy present    Gastrointestinal  · Abdominal Examination: abdomen non-tender to palpation, normal bowel sounds, no masses present  · Liver and spleen: no hepatomegaly present, spleen not palpable  · Hernias: no hernias identified    Genitourinary  · External Genitalia: normal appearance for age, no discharge present, no tenderness present, no inflammatory lesions present, no masses present  · Vagina: normal vaginal vault without central or paravaginal defects, no discharge present, no inflammatory lesions present, no masses present  · Bladder: non-tender to palpation  · Urethra: appears normal  · Cervix: normal IUD strings 1.5cm  · Uterus: normal size, shape and consistency, small, mobile, retroverted  · Adnexa: no adnexal tenderness present, no adnexal masses present  · Perineum: perineum within normal limits, no evidence of trauma, no rashes or skin lesions present    Skin  · General Inspection: no rash, no lesions identified    Neurologic/Psychiatric  · Mental Status:  · Orientation: grossly oriented to person, place and time  · Mood and Affect: mood normal, affect appropriate      Assessment/Plan:  42 y.o. female presenting for her annual exam. Overall doing well. Well woman exam:  Normal gynecologic and breast exams. Healthy habits and lifestyle reviewed. Pap with HPV UTD. Patient declines STD screening. Mammogram order placed. Menstrual regulation and contraception - opts for replacement of her Mirena IUD. Performed today, see below. Yoni Grijalva MD      IUD REPLACEMENT    Indications for Removal:  Gavi Lindsey is a ,  42 y.o. female WHITE/NON- whose No LMP recorded. (Menstrual status: IUD). was on . who presents today for IUD replacement. Her current IUD was placed 5 years ago. She has not had problems with the IUD. She requests replacement of the IUD because the IUD effectiveness has . The IUD removal procedure was discussed with the patient and she had no further questions. Procedure: The patient was placed in a dorsal lithotomy position and appropriately draped. On bimanual exam the uterus was retroverted and normal in size with no tenderness present. A speculum exam was performed and the cervix was visualized. The cervix was prepped with betadine solution. The IUD string was visualized. Using ring forceps , the string was grasped and the IUD removed intact. The IUD was shown to the patient. Mirena IUD INSERTION  Indications: The risks, benefits and alternatives of IUD insertion were discussed in detail. She also has reviewed IUD information. She has elected to proceed with the insertion today and she states she has no further questions. Procedure: The pelvic exam revealed normal external genitalia. On bimanual exam the uterus was retroverted and normal in size with no tenderness present. A speculum was inserted into the vagina and the cervix was visualized. The cervix was prepped with betadine solution. The anterior lip of the cervix was grasped with an single tooth tenaculum. The uterus was sounded with a Saini sound to 6.75 centimeters. A Mirena IUD was then inserted without difficulty. The string was cut to 1.5 to 2 centimeters. She experienced a mild amount of cramping. Post Procedure Status: She tolerated the procedure well. The patient received Mirena lot number [id number].     Brian Collins MD

## 2021-06-29 NOTE — PATIENT INSTRUCTIONS
IUD Removal: Care Instructions  Your Care Instructions     The intrauterine device (IUD) is a method of birth control. It is a small, plastic, T-shaped device that contains copper or hormones. It is placed in your uterus. You may have had your IUD removed because you want to become pregnant. Or maybe it caused pain, bleeding, or an infection. You may have chosen another method of birth control. If you don't want to get pregnant, make sure to use another form of birth control now that your IUD is not in place. Talk to your doctor about other forms of birth control. Follow-up care is a key part of your treatment and safety. Be sure to make and go to all appointments, and call your doctor if you are having problems. It's also a good idea to know your test results and keep a list of the medicines you take. How can you care for yourself at home? · IUD removal does not usually cause any pain or problems if the IUD is removed because you want to become pregnant or because of bleeding. · Once the IUD is taken out, you can become pregnant. If you want to become pregnant, you can start trying to have a baby as soon as you like. · If your doctor prescribed antibiotics because of an infection, take them as directed. Do not stop taking them just because you feel better. You need to take the full course of antibiotics. When should you call for help? Call your doctor now or seek immediate medical care if:    · You have pain in your belly or pelvis.     · You have severe vaginal bleeding. This means that you are soaking through your usual pads or tampons every hour for 2 or more hours.     · You have a fever.     · You have a vaginal discharge that smells bad. Watch closely for changes in your health, and be sure to contact your doctor if you have any problems. Where can you learn more?   Go to http://www.gray.com/  Enter M723 in the search box to learn more about \"IUD Removal: Care Instructions. \"  Current as of: October 8, 2020               Content Version: 12.8  © 4811-0877 [employer], [facility]. Care instructions adapted under license by [employer] (which disclaims liability or warranty for this information). If you have questions about a medical condition or this instruction, always ask your healthcare professional. [place] [address] any warranty or liability for your use of this information.

## 2021-08-03 ENCOUNTER — TRANSCRIBE ORDER (OUTPATIENT)
Dept: SCHEDULING | Age: 46
End: 2021-08-03

## 2021-08-03 DIAGNOSIS — S76.311A STRAIN OF RIGHT HAMSTRING, INITIAL ENCOUNTER: ICD-10-CM

## 2021-08-03 DIAGNOSIS — M25.561 ACUTE PAIN OF RIGHT KNEE: Primary | ICD-10-CM

## 2021-08-03 DIAGNOSIS — S83.241A TEAR OF MEDIAL MENISCUS OF RIGHT KNEE, CURRENT, UNSPECIFIED TEAR TYPE, INITIAL ENCOUNTER: ICD-10-CM

## 2021-08-06 ENCOUNTER — HOSPITAL ENCOUNTER (OUTPATIENT)
Dept: MRI IMAGING | Age: 46
Discharge: HOME | End: 2021-08-06
Attending: PHYSICIAN ASSISTANT
Payer: COMMERCIAL

## 2021-08-06 DIAGNOSIS — S76.311A STRAIN OF RIGHT HAMSTRING, INITIAL ENCOUNTER: ICD-10-CM

## 2021-08-06 DIAGNOSIS — M25.561 ACUTE PAIN OF RIGHT KNEE: ICD-10-CM

## 2021-08-06 DIAGNOSIS — S83.241A TEAR OF MEDIAL MENISCUS OF RIGHT KNEE, CURRENT, UNSPECIFIED TEAR TYPE, INITIAL ENCOUNTER: ICD-10-CM

## 2021-08-06 PROCEDURE — 73721 MRI JNT OF LWR EXTRE W/O DYE: CPT

## 2021-09-11 ENCOUNTER — APPOINTMENT (OUTPATIENT)
Dept: GENERAL RADIOLOGY | Age: 46
End: 2021-09-11
Attending: EMERGENCY MEDICINE
Payer: COMMERCIAL

## 2021-09-11 ENCOUNTER — HOSPITAL ENCOUNTER (EMERGENCY)
Age: 46
Discharge: HOME | End: 2021-09-11
Attending: EMERGENCY MEDICINE
Payer: COMMERCIAL

## 2021-09-11 VITALS
WEIGHT: 129.63 LBS | OXYGEN SATURATION: 100 % | TEMPERATURE: 97.9 F | BODY MASS INDEX: 20.35 KG/M2 | HEIGHT: 67 IN | HEART RATE: 57 BPM | RESPIRATION RATE: 12 BRPM | DIASTOLIC BLOOD PRESSURE: 73 MMHG | SYSTOLIC BLOOD PRESSURE: 114 MMHG

## 2021-09-11 DIAGNOSIS — R07.9 CHEST PAIN, UNSPECIFIED TYPE: Primary | ICD-10-CM

## 2021-09-11 LAB
ALBUMIN SERPL-MCNC: 3.9 G/DL (ref 3.5–5)
ALBUMIN/GLOB SERPL: 1.3 {RATIO} (ref 1.1–2.2)
ALP SERPL-CCNC: 43 U/L (ref 45–117)
ALT SERPL-CCNC: 15 U/L (ref 12–78)
ANION GAP SERPL CALC-SCNC: 7 MMOL/L (ref 5–15)
AST SERPL-CCNC: 15 U/L (ref 15–37)
BASOPHILS # BLD: 0 K/UL (ref 0–0.1)
BASOPHILS NFR BLD: 1 % (ref 0–1)
BILIRUB SERPL-MCNC: 0.4 MG/DL (ref 0.2–1)
BUN SERPL-MCNC: 19 MG/DL (ref 6–20)
BUN/CREAT SERPL: 19 (ref 12–20)
CALCIUM SERPL-MCNC: 8.8 MG/DL (ref 8.5–10.1)
CHLORIDE SERPL-SCNC: 106 MMOL/L (ref 97–108)
CO2 SERPL-SCNC: 25 MMOL/L (ref 21–32)
CREAT SERPL-MCNC: 0.99 MG/DL (ref 0.55–1.02)
D DIMER PPP FEU-MCNC: <0.19 MG/L FEU (ref 0–0.65)
DIFFERENTIAL METHOD BLD: NORMAL
EOSINOPHIL # BLD: 0.3 K/UL (ref 0–0.4)
EOSINOPHIL NFR BLD: 5 % (ref 0–7)
ERYTHROCYTE [DISTWIDTH] IN BLOOD BY AUTOMATED COUNT: 11.7 % (ref 11.5–14.5)
GLOBULIN SER CALC-MCNC: 3 G/DL (ref 2–4)
GLUCOSE SERPL-MCNC: 95 MG/DL (ref 65–100)
HCT VFR BLD AUTO: 40.5 % (ref 35–47)
HGB BLD-MCNC: 13.4 G/DL (ref 11.5–16)
IMM GRANULOCYTES # BLD AUTO: 0 K/UL (ref 0–0.04)
IMM GRANULOCYTES NFR BLD AUTO: 0 % (ref 0–0.5)
LYMPHOCYTES # BLD: 1.5 K/UL (ref 0.8–3.5)
LYMPHOCYTES NFR BLD: 24 % (ref 12–49)
MCH RBC QN AUTO: 31.2 PG (ref 26–34)
MCHC RBC AUTO-ENTMCNC: 33.1 G/DL (ref 30–36.5)
MCV RBC AUTO: 94.2 FL (ref 80–99)
MONOCYTES # BLD: 0.5 K/UL (ref 0–1)
MONOCYTES NFR BLD: 8 % (ref 5–13)
NEUTS SEG # BLD: 4 K/UL (ref 1.8–8)
NEUTS SEG NFR BLD: 63 % (ref 32–75)
NRBC # BLD: 0 K/UL (ref 0–0.01)
NRBC BLD-RTO: 0 PER 100 WBC
PLATELET # BLD AUTO: 170 K/UL (ref 150–400)
PMV BLD AUTO: 11.2 FL (ref 8.9–12.9)
POTASSIUM SERPL-SCNC: 4.1 MMOL/L (ref 3.5–5.1)
PROT SERPL-MCNC: 6.9 G/DL (ref 6.4–8.2)
RBC # BLD AUTO: 4.3 M/UL (ref 3.8–5.2)
SODIUM SERPL-SCNC: 138 MMOL/L (ref 136–145)
TROPONIN I SERPL-MCNC: <0.05 NG/ML
WBC # BLD AUTO: 6.3 K/UL (ref 3.6–11)

## 2021-09-11 PROCEDURE — 85379 FIBRIN DEGRADATION QUANT: CPT

## 2021-09-11 PROCEDURE — 85025 COMPLETE CBC W/AUTO DIFF WBC: CPT

## 2021-09-11 PROCEDURE — 80053 COMPREHEN METABOLIC PANEL: CPT

## 2021-09-11 PROCEDURE — 71047 X-RAY EXAM CHEST 3 VIEWS: CPT

## 2021-09-11 PROCEDURE — 84484 ASSAY OF TROPONIN QUANT: CPT

## 2021-09-11 PROCEDURE — 99283 EMERGENCY DEPT VISIT LOW MDM: CPT

## 2021-09-11 PROCEDURE — 93005 ELECTROCARDIOGRAM TRACING: CPT

## 2021-09-11 PROCEDURE — 36415 COLL VENOUS BLD VENIPUNCTURE: CPT

## 2021-09-11 PROCEDURE — 99284 EMERGENCY DEPT VISIT MOD MDM: CPT

## 2021-09-11 NOTE — ED PROVIDER NOTES
HPI the patient developed right upper chest pain yesterday, shortly after lunch. The pain was intense for several minutes and has been a dull pain since then. She denies that the pain is pleuritic or mechanical.  She denies shortness of breath, nausea/vomiting, cough, abdominal pain or other complaints. She has been vaccinated for Covid. He has a history of pneumothorax many years ago. Past Medical History:   Diagnosis Date    Allergic rhinitis, seasonal     Depression     Encounter for removal and reinsertion of IUD 06/29/2021    HX OTHER MEDICAL 2000    Pneumothorax    Pneumothorax, spontaneous, tension 2000    28 yo (2001 and 2002), right sided       Past Surgical History:   Procedure Laterality Date    HX BREAST AUGMENTATION  10/2012    HX HEENT  2/2015    nasal     HX OTHER SURGICAL  2000    Pneumothroax    IMPLANT BREAST SILICONE/EQ Bilateral 4336         Family History:   Problem Relation Age of Onset    Heart Disease Other         Paternal Side of Family    Other Maternal Grandfather         Aneurysm    Stroke Maternal Grandfather     Stroke Maternal Grandmother     Heart Disease Paternal Grandmother     Cancer Paternal Grandfather         lung    Other Mother 72        osteoporosis    Thyroid Disease Father     Breast Cancer Neg Hx        Social History     Socioeconomic History    Marital status:      Spouse name: Not on file    Number of children: Not on file    Years of education: Not on file    Highest education level: Not on file   Occupational History    Occupation: RN   Tobacco Use    Smoking status: Never Smoker    Smokeless tobacco: Never Used   Vaping Use    Vaping Use: Never used   Substance and Sexual Activity    Alcohol use: Yes     Comment: 6-8 glasses of wine per week    Drug use: No    Sexual activity: Yes     Partners: Male     Birth control/protection: I.U.D.    Other Topics Concern    Not on file   Social History Narrative    Pt is at 5.0 [employer] . She will be playing up in [employer]. 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, painting, pulled up carpet             bad knee surgery, achilles    Daughter 9 yo    Son 7     healthy     Social Determinants of Health     Financial Resource Strain:     Difficulty of Paying Living Expenses:    Food Insecurity:     Worried About Running Out of Food in the Last Year:     [address] in the Last Year:    Transportation Needs:     Lack of Transportation (Medical):  Lack of Transportation (Non-Medical):    Physical Activity:     Days of Exercise per Week:     Minutes of Exercise per Session:    Stress:     Feeling of Stress :    Social Connections:     Frequency of Communication with Friends and Family:     Frequency of Social Gatherings with Friends and Family:     Attends Church Services:     Active Member of Clubs or Organizations:     Attends Club or Organization Meetings:     Marital Status:    Intimate Partner Violence:     Fear of Current or Ex-Partner:     Emotionally Abused:     Physically Abused:     Sexually Abused: ALLERGIES: Patient has no known allergies. Review of Systems   Constitutional: Negative for fever. HENT: Negative for voice change. Eyes: Negative for visual disturbance. Respiratory: Negative for cough and shortness of breath. Cardiovascular: Positive for chest pain. Gastrointestinal: Negative for abdominal pain, nausea and vomiting. Genitourinary: Negative for flank pain. Musculoskeletal: Negative for back pain. Skin: Negative for rash. Neurological: Negative for headaches. Psychiatric/Behavioral: Negative for confusion. There were no vitals filed for this visit. Physical Exam  Constitutional:       General: She is not in acute distress. Appearance: She is well-developed. HENT:      Head: Normocephalic. Cardiovascular:      Rate and Rhythm: Normal rate. Heart sounds: No murmur heard. Pulmonary:      Effort: Pulmonary effort is normal.      Breath sounds: Normal breath sounds. Abdominal:      Palpations: Abdomen is soft. Tenderness: There is no abdominal tenderness. Musculoskeletal:         General: Normal range of motion. Cervical back: Normal range of motion. Skin:     General: Skin is warm and dry. Capillary Refill: Capillary refill takes less than 2 seconds. Neurological:      Mental Status: She is alert. Psychiatric:         Behavior: Behavior normal.          MDM       Procedures ED EKG interpretation:  Rhythm NSR, rate 62. T wave abnormality in the inferior leads. This EKG was interpreted by Eliecer Ortiz MD,ED Provider.

## 2021-09-11 NOTE — ED TRIAGE NOTES
Pt ambulates to treatment area she states that since yesterday after lunch while at work she has had chest pain on the right upper chest.  She states that the onset was intense for about 15 minutes then it reduced to a dull ache that has remained there until today. She denies any N/V, SOB or diaphoresis with the pain.   She has not taken anything for the pain

## 2021-09-12 LAB
ATRIAL RATE: 62 BPM
CALCULATED P AXIS, ECG09: 82 DEGREES
CALCULATED R AXIS, ECG10: 78 DEGREES
DIAGNOSIS, 93000: NORMAL
P-R INTERVAL, ECG05: 134 MS
Q-T INTERVAL, ECG07: 414 MS
QRS DURATION, ECG06: 86 MS
QTC CALCULATION (BEZET), ECG08: 420 MS
VENTRICULAR RATE, ECG03: 62 BPM

## 2021-09-13 ENCOUNTER — PATIENT OUTREACH (OUTPATIENT)
Dept: OTHER | Age: 46
End: 2021-09-13

## 2021-09-13 NOTE — PROGRESS NOTES
Verified  and address for HIPAA security. Introduced [employer] for patient. Patient does not identify any Care Management needs at this time and declines services. *Spoke with patient who reports that she is doing ok at this time. ED Visit - Labs, Test were reassuring. Patient states she will follow up with PCP if symptoms continue or worsen. Patient states that she is doing well enough that additional support is not needed at this time. Patient had no other questions or concerns. Contact information provided and reminded her that I can be reached if any needs arise in the future.

## 2021-09-30 ENCOUNTER — LAB ONLY (OUTPATIENT)
Dept: OBGYN CLINIC | Age: 46
End: 2021-09-30

## 2021-09-30 DIAGNOSIS — Z00.00 WELLNESS EXAMINATION: Primary | ICD-10-CM

## 2021-09-30 LAB
ALBUMIN SERPL-MCNC: 4.1 G/DL (ref 3.5–5)
ALBUMIN/GLOB SERPL: 1.4 {RATIO} (ref 1.1–2.2)
ALP SERPL-CCNC: 47 U/L (ref 45–117)
ALT SERPL-CCNC: 16 U/L (ref 12–78)
ANION GAP SERPL CALC-SCNC: 6 MMOL/L (ref 5–15)
AST SERPL-CCNC: 12 U/L (ref 15–37)
BILIRUB SERPL-MCNC: 0.4 MG/DL (ref 0.2–1)
BUN SERPL-MCNC: 21 MG/DL (ref 6–20)
BUN/CREAT SERPL: 21 (ref 12–20)
CALCIUM SERPL-MCNC: 9.2 MG/DL (ref 8.5–10.1)
CHLORIDE SERPL-SCNC: 105 MMOL/L (ref 97–108)
CHOLEST SERPL-MCNC: 148 MG/DL
CO2 SERPL-SCNC: 29 MMOL/L (ref 21–32)
CREAT SERPL-MCNC: 1.01 MG/DL (ref 0.55–1.02)
GLOBULIN SER CALC-MCNC: 3 G/DL (ref 2–4)
GLUCOSE SERPL-MCNC: 81 MG/DL (ref 65–100)
HDLC SERPL-MCNC: 55 MG/DL
HDLC SERPL: 2.7 {RATIO} (ref 0–5)
LDLC SERPL CALC-MCNC: 80.2 MG/DL (ref 0–100)
POTASSIUM SERPL-SCNC: 4.3 MMOL/L (ref 3.5–5.1)
PROT SERPL-MCNC: 7.1 G/DL (ref 6.4–8.2)
SODIUM SERPL-SCNC: 140 MMOL/L (ref 136–145)
TRIGL SERPL-MCNC: 64 MG/DL (ref ?–150)
VLDLC SERPL CALC-MCNC: 12.8 MG/DL

## 2021-10-05 ENCOUNTER — HOSPITAL ENCOUNTER (OUTPATIENT)
Dept: MAMMOGRAPHY | Age: 46
Discharge: HOME | End: 2021-10-05
Attending: OBSTETRICS & GYNECOLOGY
Payer: COMMERCIAL

## 2021-10-05 DIAGNOSIS — Z01.419 ENCOUNTER FOR ANNUAL ROUTINE GYNECOLOGICAL EXAMINATION: ICD-10-CM

## 2021-10-05 PROCEDURE — 77063 BREAST TOMOSYNTHESIS BI: CPT

## 2021-10-19 RX ORDER — BUSPIRONE HYDROCHLORIDE 7.5 MG/1
7.5 TABLET ORAL 3 TIMES DAILY
Qty: 270 TABLET | Refills: 3 | Status: SHIPPED | OUTPATIENT
Start: 2021-10-19

## 2021-12-07 NOTE — PROGRESS NOTES
Chief Complaint Patient presents with  Complete Physical  
 
 Katherine Riggs is a 45 y.o. female and presents with Complete Physical 
.daughter of Tj Jackson from [facility] is here for her annual.  Since last visit she has been doing very well with her tennis. She was ranked by [employer] 5.0. She has had situational stress and is undergoing separation with . She has had counseling individually and separately. Not on antidepressant anymore. Sick of medicine not really helping her. She is seeing Dr. Nic Gaspar. Subjective: 
Depression She is not on Wellbutrin anymore. She reports she was feeling very good she is being seen by counselor, Dr. Osmar Max. She read Lost Connections book which was good for her. She reports her  had a right knee surgery and left him unable to exercise. She reports that they became distant. She started to work on her tennis more. She is in the midst of a separation. She feels the separation is right and she is taking the stressors with different mindset. She is currently sleeping well. Menopausal bleed Mirena will keep for 2 more year out in 2021 New symptoms per pt as she has not had menses in 7 years. Patient is due for her Pap pelvic Allergies Taking prn benedryl Review of Systems Constitutional: negative for fevers, chills, anorexia and weight loss Eyes:   negative for visual disturbance and irritation ENT:   negative for tinnitus,sore throat,nasal congestion,ear pains. hoarseness Respiratory:  negative for cough, hemoptysis, dyspnea,wheezing CV:   negative for chest pain, palpitations, lower extremity edema GI:   negative for nausea, vomiting, diarrhea, abdominal pain,melena Endo:               negative for polyuria,polydipsia,polyphagia,heat intolerance Genitourinary: negative for frequency, dysuria and hematuria Integument:  negative for rash and pruritus Hematologic:  negative for easy bruising and gum/nose bleeding Musculoskel: negative for myalgias, arthralgias, back pain, muscle weakness, joint pain Neurological:  negative for headaches, dizziness, vertigo, memory problems and gait Behavl/Psych: negative for feelings of anxiety, depression, mood changes Past Medical History:  
Diagnosis Date  Allergic rhinitis, seasonal   
 Depression [address] Pneumothorax  Pneumothorax, spontaneous, tension 2000  
 26 yo (2001 and 2002), right sided Past Surgical History:  
Procedure Laterality Date  HX BREAST AUGMENTATION  10/2012  HX HEENT  2/2015  
 nasal   
 HX OTHER SURGICAL  2000 Pneumothroax Social History Socioeconomic History  Marital status:  Spouse name: Not on file  Number of children: Not on file  Years of education: Not on file  Highest education level: Not on file Occupational History  Occupation: RN Tobacco Use  Smoking status: Never Smoker  Smokeless tobacco: Never Used Substance and Sexual Activity  Alcohol use: Yes Comment: 6-8 glasses of wine per week  Drug use: No  
 Sexual activity: Yes  
  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   
 Home improvement projects, painting, pulled up carpet Works t-r; 8-5 pm  
   
   
 Daughter 9 yo Son 7  healthy Family History Problem Relation Age of Onset  Heart Disease Other Paternal Side of Family  Other Maternal Grandfather Aneurysm  Stroke Maternal Grandfather  Stroke Maternal Grandmother  Heart Disease Paternal Grandmother  Cancer Paternal Grandfather   
     lung  Other Mother 72  
     osteoporosis  Thyroid Disease Father  Breast Cancer Neg Hx Current Outpatient Medications Medication Sig Dispense Refill  albuterol (PROVENTIL HFA, VENTOLIN HFA, PROAIR HFA) 90 mcg/actuation inhaler Take 2 Puffs by inhalation every four (4) hours as needed for Wheezing. 1 Inhaler 1  Cholecalciferol, Vitamin D3, (VITAMIN D3) 1,000 unit cap Take  by mouth daily.  buPROPion SR (WELLBUTRIN SR) 150 mg SR tablet TAKE 1 TAB BY MOUTH DAILY. 90 Tab 3  
 ondansetron (ZOFRAN ODT) 4 mg disintegrating tablet TAKE 1 TAB BY MOUTH EVERY EIGHT (8) HOURS AS NEEDED FOR NAUSEA. 30 Tab 0  
 hyoscyamine sulfate (NULEV) 0.125 mg tablet Take 1 Tab by mouth every four (4) hours as needed. 15 Tab 0  
 cetirizine (ZYRTEC) 10 mg tablet Take  by mouth.  estradiol (ESTRACE) 0.01 % (0.1 mg/gram) vaginal cream Apply with cotton swab once three times weekly. (Patient not taking: Reported on 1/31/2018) 42.5 g 0 No Known Allergies Objective: 
Visit Vitals /78 (BP 1 Location: Left arm, BP Patient Position: Sitting) Pulse 80 Temp 97.7 °F (36.5 °C) (Oral) Resp 12 Ht 5' 7\" (1.702 m) Wt 121 lb (54.9 kg) SpO2 99% BMI 18.95 kg/m² Physical Exam:  
General appearance - alert, well appearing, and in no distress Mental status - alert, oriented to person, place, and time EYE-ZAYNAB, EOMI, visual acuity normal both eyes ENT-ENT exam normal, no neck nodes or sinus tenderness Nose - normal and patent, no erythema, discharge or polyps Mouth - mucous membranes moist, pharynx normal without lesions Neck - supple, no significant adenopathy Chest - clear to auscultation, no wheezes, rales or rhonchi, symmetric air entry Heart - normal rate, regular rhythm, normal S1, S2, no murmurs, rubs, clicks or gallops Abdomen - soft, nontender, nondistended, no masses or organomegaly Lymph- no adenopathy palpable Ext-peripheral pulses normal, no pedal edema, no clubbing or cyanosis Skin-Warm and dry. no hyperpigmentation, vitiligo, or suspicious lesions Neuro -alert, oriented, normal speech, no focal findings or movement disorder noted Neck-normal C-spine, no tenderness, full ROM without pain Gyn: no inguinal LAD, cervix in view and IUD noted Results for orders placed or performed in visit on 11/09/17 CBC W/O DIFF Result Value Ref Range WBC 10.6 3.4 - 10.8 x10E3/uL  
 RBC 4.01 3.77 - 5.28 x10E6/uL HGB 12.2 11.1 - 15.9 g/dL HCT 36.7 34.0 - 46.6 % MCV 92 79 - 97 fL  
 MCH 30.4 26.6 - 33.0 pg  
 MCHC 33.2 31.5 - 35.7 g/dL  
 RDW 13.0 12.3 - 15.4 % PLATELET 651 173 - 494 x10E3/uL METABOLIC PANEL, COMPREHENSIVE Result Value Ref Range Glucose 80 65 - 99 mg/dL BUN 19 6 - 24 mg/dL Creatinine 0.96 0.57 - 1.00 mg/dL GFR est non-AA 74 >59 mL/min/1.73 GFR est AA 86 >59 mL/min/1.73  
 BUN/Creatinine ratio 20 9 - 23 Sodium 141 134 - 144 mmol/L Potassium 4.0 3.5 - 5.2 mmol/L Chloride 104 96 - 106 mmol/L  
 CO2 24 18 - 29 mmol/L Calcium 8.9 8.7 - 10.2 mg/dL Protein, total 6.5 6.0 - 8.5 g/dL Albumin 4.3 3.5 - 5.5 g/dL GLOBULIN, TOTAL 2.2 1.5 - 4.5 g/dL A-G Ratio 2.0 1.2 - 2.2 Bilirubin, total 0.4 0.0 - 1.2 mg/dL Alk. phosphatase 53 39 - 117 IU/L  
 AST (SGOT) 18 0 - 40 IU/L  
 ALT (SGPT) 11 0 - 32 IU/L  
LIPID PANEL Result Value Ref Range Cholesterol, total 134 100 - 199 mg/dL Triglyceride 52 0 - 149 mg/dL HDL Cholesterol 66 >39 mg/dL VLDL, calculated 10 5 - 40 mg/dL LDL, calculated 58 0 - 99 mg/dL TSH 3RD GENERATION Result Value Ref Range TSH 1.340 0.450 - 4.500 uIU/mL VITAMIN D, 25 HYDROXY Result Value Ref Range VITAMIN D, 25-HYDROXY 43.7 30.0 - 100.0 ng/mL CVD REPORT Result Value Ref Range INTERPRETATION Note Prevention Cardiovascular profile Family hx Exercising:  Tennis, starting with  to do cross . Blood pressure: 
Health healthy diet: 
Diabetes: 
Cholesterol: 
Renal function: 
 
 
Cancer risk profile Mammogram start, on IUd ring Lung no sx, exertional  
Colonoscopy no sxy Skin nonhealing in 2 weeks, [facility], Gyn abnormal bleeding/discharge/abd pain/pressure No abdminal pain or pressre Thyroid sx Weight stable Osteopenia prevention Calcium 1000mg/day yes Vitamin D 800iu/day yes Mental health scale: 8-9/10 Situational stress 7/10 Biggest stressor with living situaton Depression Anxiety Sleep # of hours: 
Energy Level:     
 
Immunizations uptodate TDAP ?2011 Pneumonia vaccine Flu vaccine annually Shingles vaccine HPV Ama Rm Diagnoses and all orders for this visit: 
 
1. Well adult exam 
Patient appears in overall good physical health. She has some stressors related to her marriage but she is working through them. She feels she is mentally strong and will be able to handle. -     MATTY 3D CALE W MAMMO BI SCREENING INCL CAD; Future 
-     REFERRAL TO DERMATOLOGY 
-     LIPID PANEL 
-     METABOLIC PANEL, COMPREHENSIVE 
-     TSH 3RD GENERATION 
-     PAP IG, APTIMA HPV AND RFX 16/18,45 ([id number]); Future 2. Cervical cancer screening -     PAP IG, APTIMA HPV AND RFX 16/18,45 ([id number]); Future Situational stress Follow-up in 1 year or earlier if needed. Detail Level: Detailed Quality 130: Documentation Of Current Medications In The Medical Record: Current Medications Documented Quality 226: Preventive Care And Screening: Tobacco Use: Screening And Cessation Intervention: Patient screened for tobacco use and is an ex/non-smoker

## 2022-01-14 DIAGNOSIS — R53.83 FATIGUE, UNSPECIFIED TYPE: ICD-10-CM

## 2022-01-14 DIAGNOSIS — R53.83 FATIGUE, UNSPECIFIED TYPE: Primary | ICD-10-CM

## 2022-01-14 RX ORDER — ESCITALOPRAM OXALATE 5 MG/1
5 TABLET ORAL DAILY
Qty: 90 TABLET | Refills: 3 | Status: SHIPPED | OUTPATIENT
Start: 2022-01-14 | End: 2022-09-12 | Stop reason: ALTCHOICE

## 2022-01-15 LAB
25(OH)D3 SERPL-MCNC: 26 NG/ML (ref 30–100)
ALBUMIN SERPL-MCNC: 4.4 G/DL (ref 3.5–5)
ALBUMIN/GLOB SERPL: 1.4 {RATIO} (ref 1.1–2.2)
ALP SERPL-CCNC: 57 U/L (ref 45–117)
ALT SERPL-CCNC: 24 U/L (ref 12–78)
ANION GAP SERPL CALC-SCNC: 4 MMOL/L (ref 5–15)
AST SERPL-CCNC: 10 U/L (ref 15–37)
BASOPHILS # BLD: 0.1 K/UL (ref 0–0.1)
BASOPHILS NFR BLD: 1 % (ref 0–1)
BILIRUB SERPL-MCNC: 0.6 MG/DL (ref 0.2–1)
BUN SERPL-MCNC: 21 MG/DL (ref 6–20)
BUN/CREAT SERPL: 23 (ref 12–20)
CALCIUM SERPL-MCNC: 10.2 MG/DL (ref 8.5–10.1)
CHLORIDE SERPL-SCNC: 105 MMOL/L (ref 97–108)
CO2 SERPL-SCNC: 27 MMOL/L (ref 21–32)
CREAT SERPL-MCNC: 0.9 MG/DL (ref 0.55–1.02)
DIFFERENTIAL METHOD BLD: ABNORMAL
EOSINOPHIL # BLD: 0.5 K/UL (ref 0–0.4)
EOSINOPHIL NFR BLD: 5 % (ref 0–7)
ERYTHROCYTE [DISTWIDTH] IN BLOOD BY AUTOMATED COUNT: 12.1 % (ref 11.5–14.5)
GLOBULIN SER CALC-MCNC: 3.1 G/DL (ref 2–4)
GLUCOSE SERPL-MCNC: 97 MG/DL (ref 65–100)
HCT VFR BLD AUTO: 45.9 % (ref 35–47)
HGB BLD-MCNC: 14.9 G/DL (ref 11.5–16)
IMM GRANULOCYTES # BLD AUTO: 0 K/UL (ref 0–0.04)
IMM GRANULOCYTES NFR BLD AUTO: 0 % (ref 0–0.5)
LYMPHOCYTES # BLD: 2.2 K/UL (ref 0.8–3.5)
LYMPHOCYTES NFR BLD: 27 % (ref 12–49)
MCH RBC QN AUTO: 31 PG (ref 26–34)
MCHC RBC AUTO-ENTMCNC: 32.5 G/DL (ref 30–36.5)
MCV RBC AUTO: 95.4 FL (ref 80–99)
MONOCYTES # BLD: 0.7 K/UL (ref 0–1)
MONOCYTES NFR BLD: 8 % (ref 5–13)
NEUTS SEG # BLD: 4.9 K/UL (ref 1.8–8)
NEUTS SEG NFR BLD: 59 % (ref 32–75)
NRBC # BLD: 0 K/UL (ref 0–0.01)
NRBC BLD-RTO: 0 PER 100 WBC
PLATELET # BLD AUTO: 227 K/UL (ref 150–400)
PMV BLD AUTO: 11.7 FL (ref 8.9–12.9)
POTASSIUM SERPL-SCNC: 4.7 MMOL/L (ref 3.5–5.1)
PROT SERPL-MCNC: 7.5 G/DL (ref 6.4–8.2)
RBC # BLD AUTO: 4.81 M/UL (ref 3.8–5.2)
SODIUM SERPL-SCNC: 136 MMOL/L (ref 136–145)
T4 FREE SERPL-MCNC: 0.9 NG/DL (ref 0.8–1.5)
TSH SERPL DL<=0.05 MIU/L-ACNC: 1.67 UIU/ML (ref 0.36–3.74)
WBC # BLD AUTO: 8.4 K/UL (ref 3.6–11)

## 2022-04-04 NOTE — PROGRESS NOTES
HPI: Carlton Holliday (: 1975) is a 41 y.o. female, patient, here for evaluation of the following chief complaint(s): Patient presents with complaint of ongoing right wrist pain. She had an injection 5 months ago to the right wrist and responded well to the injection. Her previous images revealed an ulnar positive variance. She has discomfort in the extended position with weight bearing. The pain is at the ulnar aspect and dorsal aspect. The previous injection was at the fourth compartment of the wrist. She is hoping for another therapeutic injection. No recent injury/trauma. No other complaints or concerns. Wrist Pain (Right )       Vitals:  Ht 5' 7\" (1.702 m)   Wt 129 lb (58.5 kg)   BMI 20.20 kg/m²    Body mass index is 20.2 kg/m². No Known Allergies    Current Outpatient Medications   Medication Sig    escitalopram oxalate (LEXAPRO) 5 mg tablet Take 1 Tablet by mouth daily.  busPIRone (BUSPAR) 7.5 mg tablet Take 1 Tablet by mouth three (3) times daily.  levonorgestreL (Mirena) 20 mcg/24 hours (6 yrs) 52 mg IUD 1 Device by IntraUTERine route once.     busPIRone (BUSPAR) 5 mg tablet TAKE 1 TABLET BY MOUTH EVERY DAY AT BEDTIME AS NEEDED FOR SLEEP     Current Facility-Administered Medications   Medication    bupivacaine (PF) (MARCAINE) 0.5 % (5 mg/mL) injection 5 mg    triamcinolone acetonide (KENALOG-40) 40 mg/mL injection 40 mg       Past Medical History:   Diagnosis Date    Allergic rhinitis, seasonal     Depression     Encounter for removal and reinsertion of IUD 2021    HX OTHER MEDICAL     Pneumothorax    Pneumothorax, spontaneous, tension 56    24 yo ( and ), right sided        Past Surgical History:   Procedure Laterality Date    HX BREAST AUGMENTATION  10/2012    HX HEENT  2015    nasal     HX OTHER SURGICAL      Pneumothroax    IMPLANT BREAST SILICONE/EQ Bilateral        Family History   Problem Relation Age of Onset    Heart Disease Other Paternal Side of Family    Other Maternal Grandfather         Aneurysm    Stroke Maternal Grandfather     Stroke Maternal Grandmother     Heart Disease Paternal Grandmother    [facility] Paternal Grandfather         lung    Other Mother 72        osteoporosis    Thyroid Disease Father     Breast Cancer Neg Hx         Social History     Tobacco Use    Smoking status: Never Smoker    Smokeless tobacco: Never Used   Vaping Use    Vaping Use: Never used   Substance Use Topics    Alcohol use: Yes     Comment: 6-8 glasses of wine per week    Drug use: No        Review of Systems    Constitutional: No fevers, chills, night sweats, excessive fatigue or weight loss. Musculoskeletal: No joint pain, swelling or redness. No decreased range of motion. Neurologic: No headache, blurred vision, and no areas of focal weakness or numbness. Normal gait. No sensory problems. Respiratory: No dyspnea on exertion, orthopnea, chest pain, cough or hemoptysis. Cardiovascular: No anginal chest pain, irregular heart beat, tachycardia, palpitations or orthopnea  Integumentary: No chronic rashes, inflammation, ulcerations, pruritus, petechiae, purpura, ecchymoses, or skin changes      Physical Exam    General: Alert, cooperative, no distress  Musculosketal: Right wrist - Normal range of motion. Normal sensation. No cysts. No erythema, edema or warmth to the joint. Tenderness to palpation at the dorsal aspect of the wrist and along the ulnar aspect. Neurologic:  CNII-XII intact, Normal strength, sensation, and reflexes throughout    Imaging:  None     ASSESSMENT/PLAN:  Below is the assessment and plan developed based on review of pertinent history, physical exam, labs, studies, and medications. Ongoing right wrist pain. She had an injection 5 months ago to the right wrist and responded well to the injection. Her previous images revealed an ulnar positive variance.  She has discomfort in the extended position with weight bearing. She is hoping for another therapeutic injection. Injection administered to the fourth compartment of the right wrist on 4/5/22. Patient will follow up in 3-4 weeks to review her progress. If she continues to experience discomfort, we will obtain an MRI to evaluate for occult cysts and TFCC impaction issues. Discussed risks/benefits of cortisone injection and patient gave verbal consent. Under sterile conditions, the right wrist fourth compartement was injected with 1 cc 0.5% Bupivacaine and 1cc 40mg Triamcinolone, tolerated the procedure well. Patient consent obtained prior to the injection(s). Consent forms signed. 1. Right wrist pain  -     bupivacaine (PF) (MARCAINE) 0.5 % (5 mg/mL) injection 5 mg; 5 mg, Other, ONCE, 1 dose, On Tue 4/5/22 at 1200  -     triamcinolone acetonide (KENALOG-40) 40 mg/mL injection 40 mg; 40 mg, Other, ONCE, 1 dose, On Tue 4/5/22 at 1200  -     DRAIN/INJECT SMALL JOINT/BURSA  2. 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 Dr. Barry Bernheim was available for immediate consult during this encounter. An electronic signature was used to authenticate this note.   -- Ghassan James PA-C

## 2022-04-05 ENCOUNTER — OFFICE VISIT (OUTPATIENT)
Dept: ORTHOPEDIC SURGERY | Age: 47
End: 2022-04-05
Payer: COMMERCIAL

## 2022-04-05 VITALS — HEIGHT: 67 IN | BODY MASS INDEX: 20.25 KG/M2 | WEIGHT: 129 LBS

## 2022-04-05 DIAGNOSIS — Z13.9 SCREENING DUE: Primary | ICD-10-CM

## 2022-04-05 DIAGNOSIS — Z13.9 SCREENING DUE: ICD-10-CM

## 2022-04-05 DIAGNOSIS — M25.531 RIGHT WRIST PAIN: Primary | ICD-10-CM

## 2022-04-05 DIAGNOSIS — Q74.0 CONGENITAL POSITIVE ULNAR VARIANCE OF RIGHT WRIST: ICD-10-CM

## 2022-04-05 LAB
ALBUMIN SERPL-MCNC: 4 G/DL (ref 3.5–5)
ALBUMIN/GLOB SERPL: 1.4 {RATIO} (ref 1.1–2.2)
ALP SERPL-CCNC: 48 U/L (ref 45–117)
ALT SERPL-CCNC: 22 U/L (ref 12–78)
ANION GAP SERPL CALC-SCNC: 4 MMOL/L (ref 5–15)
AST SERPL-CCNC: 14 U/L (ref 15–37)
BILIRUB SERPL-MCNC: 0.4 MG/DL (ref 0.2–1)
BUN SERPL-MCNC: 18 MG/DL (ref 6–20)
BUN/CREAT SERPL: 20 (ref 12–20)
CALCIUM SERPL-MCNC: 9.4 MG/DL (ref 8.5–10.1)
CHLORIDE SERPL-SCNC: 107 MMOL/L (ref 97–108)
CHOLEST SERPL-MCNC: 159 MG/DL
CO2 SERPL-SCNC: 26 MMOL/L (ref 21–32)
CREAT SERPL-MCNC: 0.92 MG/DL (ref 0.55–1.02)
GLOBULIN SER CALC-MCNC: 2.9 G/DL (ref 2–4)
GLUCOSE SERPL-MCNC: 85 MG/DL (ref 65–100)
HDLC SERPL-MCNC: 62 MG/DL
HDLC SERPL: 2.6 {RATIO} (ref 0–5)
LDLC SERPL CALC-MCNC: 84.4 MG/DL (ref 0–100)
POTASSIUM SERPL-SCNC: 4.3 MMOL/L (ref 3.5–5.1)
PROT SERPL-MCNC: 6.9 G/DL (ref 6.4–8.2)
SODIUM SERPL-SCNC: 137 MMOL/L (ref 136–145)
TRIGL SERPL-MCNC: 63 MG/DL (ref ?–150)
VLDLC SERPL CALC-MCNC: 12.6 MG/DL

## 2022-04-05 PROCEDURE — 20600 DRAIN/INJ JOINT/BURSA W/O US: CPT | Performed by: PHYSICIAN ASSISTANT

## 2022-04-05 RX ORDER — TRIAMCINOLONE ACETONIDE 40 MG/ML
40 INJECTION, SUSPENSION INTRA-ARTICULAR; INTRAMUSCULAR ONCE
Status: COMPLETED | OUTPATIENT
Start: 2022-04-05 | End: 2022-04-05

## 2022-04-05 RX ORDER — BUPIVACAINE HYDROCHLORIDE 5 MG/ML
5 INJECTION, SOLUTION EPIDURAL; INTRACAUDAL ONCE
Status: COMPLETED | OUTPATIENT
Start: 2022-04-05 | End: 2022-04-05

## 2022-04-05 RX ADMIN — TRIAMCINOLONE ACETONIDE 40 MG: 40 INJECTION, SUSPENSION INTRA-ARTICULAR; INTRAMUSCULAR at 11:51

## 2022-04-05 RX ADMIN — BUPIVACAINE HYDROCHLORIDE 5 MG: 5 INJECTION, SOLUTION EPIDURAL; INTRACAUDAL at 11:50

## 2022-08-23 RX ORDER — CLOBETASOL PROPIONATE 0.5 MG/G
OINTMENT TOPICAL 2 TIMES DAILY
Qty: 15 G | Refills: 0 | Status: SHIPPED | OUTPATIENT
Start: 2022-08-23

## 2022-09-12 RX ORDER — ESCITALOPRAM OXALATE 10 MG/1
10 TABLET ORAL DAILY
Qty: 90 TABLET | Refills: 1 | Status: SHIPPED | OUTPATIENT
Start: 2022-09-12

## 2023-04-21 ENCOUNTER — OFFICE VISIT (OUTPATIENT)
Dept: ORTHOPEDIC SURGERY | Age: 48
End: 2023-04-21

## 2023-04-21 DIAGNOSIS — Q74.0 CONGENITAL POSITIVE ULNAR VARIANCE OF RIGHT WRIST: ICD-10-CM

## 2023-04-21 DIAGNOSIS — M79.601 PAIN OF RIGHT ARM: Primary | ICD-10-CM

## 2023-04-21 DIAGNOSIS — M25.531 RIGHT WRIST PAIN: ICD-10-CM

## 2023-04-21 DIAGNOSIS — M25.511 ACUTE PAIN OF RIGHT SHOULDER: ICD-10-CM

## 2023-04-22 NOTE — PROGRESS NOTES
Patient Name: Esaw Nageotte  Date:2023  : 1975  [x]  Patient  Verified  Payor: Priscila Camacho / Plan: [facility] [address] / Product Type: PPO /    Total Treatment Time (min): 50  Total Timed Codes (min): 50    Visit #: 1   Referring Provider: Imelda Alcantara MD    Physical exam:  Right Shoulder Wrist and hand    Subjective: The patient is a 82-year-old right-hand-dominant female. She is referred to physical therapy by Dr. Aleena Duncan and Griffin Morris for evaluation and treatment of the right upper extremity. The patient reports she is an avid  who has had chronic right wrist pain previously treated with cortisone injections. The injections have been effective but she continues to complain of symptoms in the right upper extremity including both the wrist and dorsal forearm and symptoms into the right periscapular and shoulder region. She reports she is reduced her recreational activities secondary to the right upper extremity pain. She does report difficulty with functional activities including lifting and carrying as well as pain with work-related activities and including fine motor tasks and  strength. She describes the symptoms of being diffuse and global.  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 She does admit change in activity level for training and right upper extremity arm care which could be attributing to her current symptoms.   Past Medical History:   Diagnosis Date    Allergic rhinitis, seasonal     Depression     Encounter for removal and reinsertion of IUD 2021    HX OTHER MEDICAL 2000    Pneumothorax    Pneumothorax, spontaneous, tension     23 yo ( and ), right sided     Current Outpatient Medications   Medication Instructions    busPIRone (BUSPAR) 5 mg tablet TAKE 1 TABLET BY MOUTH EVERY DAY AT BEDTIME AS NEEDED FOR SLEEP    levonorgestreL (Mirena) 20 mcg/24 hours (6 yrs) 52 mg IUD 1 Device, IntraUTERine, ONCE     Past Surgical History:   Procedure Laterality Date    HX BREAST AUGMENTATION  10/2012    HX HEENT  2/2015    nasal     HX OTHER SURGICAL  2000    Pneumothroax    IMPLANT BREAST SILICONE/EQ Bilateral 1667     Vitals:  Ht 5' 7\" (1.702 m)   Wt 129 lb (58.5 kg)   BMI 20.20 kg/m²    Body mass index is 20.2 kg/m². Objective: Inspection: Posture reveals protracted bilateral scapular anterior closure  The patient's right hand and upper extremity reveals no swelling or ecchymosis ,erythema or cysts  All incisions none noted  Pain:  The patient reports pain with functional activities 4-5  / 10 VAS scale. 1-2 at rest     Musculosketal: Right hand - Full range of motion. Normal sensation. No cysts. No erythema, edema or warmth to the joints. Right wrist - Full range of motion. Normal sensation. No palpable cysts. No erythema, edema or warmth to the joints. Tenderness to palpation at the dorsal aspect of the wrist and along the ulnar aspect. Negative Tinel's, Phalen's and Finkelstein's test.  She does have pain with resisted wrist flexion extension and endrange  Right elbow - Full range of motion. Normal sensation. No erythema, edema or warmth to the joint. No fluid collection. Negative hook test. No sign of triceps tendon disruption. Right shoulder -reveals full range of motion in all planes with the exception of external and internal rotation where she exhibits deficits from the contralateral limb and 10 degrees of external rotation and 10 degrees of internal rotation functional strength is normal she does have pain with resisted internal rotation and external rotation in the AB ducted position  normal sensation. No erythema, edema or warmth to the joint. No impingement    There is adapted closure over the anterior shoulder with tightness noted of the pectoralis major and minor poor scapular upward rotation with active range of motion.     Neurologic:  CNII-XII intact, Normal strength, sensation, and reflexes throughout  Functional assessment of the right upper extremity shows limitation with her scapular upward rotation      Treatment:  Low complexity evaluation 20 minutes  Therapeutic activity instruction 30 minutes  1. Diaphragmatic/box breathing. 2.  Pectoralis major minor stretching in prone position  3. Functional strengthening to the mid and lower trap  4. Combined active external rotation strengthening with postural T-spine extension scapular retraction  5. Instruction in flex bar functional  strengthening activities. 6.  Recreational activity discussion in regard to functional  size strength tension and possible evaluation by tennis pro for progressive evaluation of swing mechanics    Assessment:  1. Pain of right arm  2. Right wrist pain  3. Acute pain of right shoulder    Patient presents with right upper extremity pain. Mechanically she has some restriction in her right periscapular and upper extremity mobility with limitation in her external rotation internal rotation as well as functional periscapular strength. There are some adaptive closure over the anterior chest and feel that a component of her wrist pain is compensatory secondary to the lack of the proximal stabilizers and functional ability of the right shoulder    Physical therapy goals    Long-term goal -8 weeks  1. The patient will independently perform all self-directed ADLs without exacerbation of pain from baseline. 2.  Patient will return to playing tennis without functional deficits  3. The patient will complete daily work-related tasks without functional loss in regard to dexterity and pain  Short-term goal 3 weeks. 1.  Independent demonstration of a home exercise program to facilitate recovery. Physical therapy plan of care    Treatment frequency 1X weekly. Duration 8 visits.     Focus of therapy will be on progressive restoration of range of motion and strength, balance, and functional mobility to return to pre injury status. Therapeutic applications will include but are not limited to:  Home exercise program development and implementation with updating as needed. Intramuscular dry needling to the involved region. Manual therapy, joint mobilization, myofascial release, therapeutic exercises. Modalities including ultrasound and electric stimulation heat and ice. Kinesio-tape and Maurer taping for joint reeducation and approximation of tissue for neuromuscular reeducation. Ossie Kayser, PT          The referring physician has reviewed and approved this evaluation and plan of care as noted by the electronic signature attached to note.

## 2023-05-02 ENCOUNTER — OFFICE VISIT (OUTPATIENT)
Dept: ORTHOPEDIC SURGERY | Age: 48
End: 2023-05-02

## 2023-05-02 DIAGNOSIS — Q74.0 CONGENITAL POSITIVE ULNAR VARIANCE OF RIGHT WRIST: ICD-10-CM

## 2023-05-02 DIAGNOSIS — M25.531 RIGHT WRIST PAIN: Primary | ICD-10-CM

## 2023-05-03 VITALS — HEIGHT: 67 IN | WEIGHT: 129 LBS | BODY MASS INDEX: 20.25 KG/M2

## 2023-05-03 RX ORDER — BUPIVACAINE HYDROCHLORIDE 5 MG/ML
5 INJECTION, SOLUTION EPIDURAL; INTRACAUDAL ONCE
Status: SHIPPED | OUTPATIENT
Start: 2023-05-03 | End: 2023-05-04

## 2023-05-03 RX ORDER — BETAMETHASONE SODIUM PHOSPHATE AND BETAMETHASONE ACETATE 3; 3 MG/ML; MG/ML
6 INJECTION, SUSPENSION INTRA-ARTICULAR; INTRALESIONAL; INTRAMUSCULAR; SOFT TISSUE ONCE
Status: SHIPPED | OUTPATIENT
Start: 2023-05-03 | End: 2023-05-04

## 2023-05-04 ENCOUNTER — OFFICE VISIT (OUTPATIENT)
Dept: ORTHOPEDIC SURGERY | Age: 48
End: 2023-05-04

## 2023-05-04 DIAGNOSIS — M25.511 ACUTE PAIN OF RIGHT SHOULDER: ICD-10-CM

## 2023-05-04 DIAGNOSIS — Q74.0 CONGENITAL POSITIVE ULNAR VARIANCE OF RIGHT WRIST: Primary | ICD-10-CM

## 2023-05-04 DIAGNOSIS — M79.601 PAIN OF RIGHT ARM: ICD-10-CM

## 2023-05-04 DIAGNOSIS — Q74.0 CONGENITAL POSITIVE ULNAR VARIANCE OF RIGHT WRIST: ICD-10-CM

## 2023-05-04 DIAGNOSIS — M25.531 RIGHT WRIST PAIN: Primary | ICD-10-CM

## 2023-05-10 DIAGNOSIS — Z00.00 WELLNESS EXAMINATION: ICD-10-CM

## 2023-05-10 DIAGNOSIS — Z00.00 WELLNESS EXAMINATION: Primary | ICD-10-CM

## 2023-06-15 LAB
ALBUMIN SERPL-MCNC: 4.1 G/DL (ref 3.5–5)
ALBUMIN/GLOB SERPL: 1.4 (ref 1.1–2.2)
ALP SERPL-CCNC: 47 U/L (ref 45–117)
ALT SERPL-CCNC: 16 U/L (ref 12–78)
ANION GAP SERPL CALC-SCNC: 3 MMOL/L (ref 5–15)
AST SERPL-CCNC: 8 U/L (ref 15–37)
BILIRUB SERPL-MCNC: 0.7 MG/DL (ref 0.2–1)
BUN SERPL-MCNC: 18 MG/DL (ref 6–20)
BUN/CREAT SERPL: 19 (ref 12–20)
CALCIUM SERPL-MCNC: 10 MG/DL (ref 8.5–10.1)
CHLORIDE SERPL-SCNC: 107 MMOL/L (ref 97–108)
CHOLEST SERPL-MCNC: 166 MG/DL
CO2 SERPL-SCNC: 29 MMOL/L (ref 21–32)
CREAT SERPL-MCNC: 0.94 MG/DL (ref 0.55–1.02)
ERYTHROCYTE [DISTWIDTH] IN BLOOD BY AUTOMATED COUNT: 11.8 % (ref 11.5–14.5)
FERRITIN SERPL-MCNC: 52 NG/ML (ref 26–388)
GLOBULIN SER CALC-MCNC: 3 G/DL (ref 2–4)
GLUCOSE SERPL-MCNC: 89 MG/DL (ref 65–100)
HCT VFR BLD AUTO: 44.3 % (ref 35–47)
HDLC SERPL-MCNC: 57 MG/DL
HDLC SERPL: 2.9 (ref 0–5)
HGB BLD-MCNC: 14.2 G/DL (ref 11.5–16)
LDLC SERPL CALC-MCNC: 91 MG/DL (ref 0–100)
MCH RBC QN AUTO: 31.1 PG (ref 26–34)
MCHC RBC AUTO-ENTMCNC: 32.1 G/DL (ref 30–36.5)
MCV RBC AUTO: 96.9 FL (ref 80–99)
NRBC # BLD: 0 K/UL (ref 0–0.01)
NRBC BLD-RTO: 0 PER 100 WBC
PLATELET # BLD AUTO: 196 K/UL (ref 150–400)
PMV BLD AUTO: 12.1 FL (ref 8.9–12.9)
POTASSIUM SERPL-SCNC: 4.2 MMOL/L (ref 3.5–5.1)
PROT SERPL-MCNC: 7.1 G/DL (ref 6.4–8.2)
RBC # BLD AUTO: 4.57 M/UL (ref 3.8–5.2)
SODIUM SERPL-SCNC: 139 MMOL/L (ref 136–145)
TRIGL SERPL-MCNC: 90 MG/DL
VLDLC SERPL CALC-MCNC: 18 MG/DL
WBC # BLD AUTO: 6.5 K/UL (ref 3.6–11)

## 2023-07-17 ENCOUNTER — OFFICE VISIT (OUTPATIENT)
Age: 48
End: 2023-07-17
Payer: COMMERCIAL

## 2023-07-17 VITALS — SYSTOLIC BLOOD PRESSURE: 114 MMHG | BODY MASS INDEX: 20.36 KG/M2 | DIASTOLIC BLOOD PRESSURE: 71 MMHG | WEIGHT: 130 LBS

## 2023-07-17 DIAGNOSIS — Z82.49 FAMILY HISTORY OF ANEURYSM: ICD-10-CM

## 2023-07-17 DIAGNOSIS — Z01.419 ENCOUNTER FOR WELL WOMAN EXAM WITH ROUTINE GYNECOLOGICAL EXAM: Primary | ICD-10-CM

## 2023-07-17 DIAGNOSIS — Z12.31 ENCOUNTER FOR SCREENING MAMMOGRAM FOR MALIGNANT NEOPLASM OF BREAST: ICD-10-CM

## 2023-07-17 PROCEDURE — 99396 PREV VISIT EST AGE 40-64: CPT | Performed by: OBSTETRICS & GYNECOLOGY

## 2023-07-17 RX ORDER — ESTRADIOL 0.04 MG/D
1 FILM, EXTENDED RELEASE TRANSDERMAL
Qty: 24 PATCH | Refills: 3 | Status: SHIPPED | OUTPATIENT
Start: 2023-07-17

## 2023-07-17 NOTE — PROGRESS NOTES
Annual Exam    Kar Galindo is a 57 y.o.  presenting for annual exam. Her main concerns today include wellness. Occasional brown spotting, no true menses. Due for colonoscopy. Her mother was diagnosed with a brain aneurysm and her maternal grandfather  of a ruptured aneurysm, Kavya Pierson is indicated for aneurysm screening based on family history. She declines a chaperone during the gynecologic exam today. Ob/Gyn Hx:    Menses - IUD  Sexually active - Yes  Contraception - IUD    Health maintenance:  Pap - , NILM HPV neg. Mammo - 2021, B1  Colonoscopy - No history of. Past Medical History:   Diagnosis Date    Allergic rhinitis, seasonal     Depression     Encounter for removal and reinsertion of IUD 2021    Pneumothorax, spontaneous, tension     23 yo ( and ), right sided       Past Surgical History:   Procedure Laterality Date    BREAST SURGERY  10/2012    HEENT  2015    nasal     IMPLANT BREAST SILICONE/EQ Bilateral 8833    OTHER SURGICAL HISTORY      Pneumothroax    ULNA OSTEOTOMY Right     WRIST DEBRIDEMENT Right        Family History   Problem Relation Age of Onset    Other Mother 72        osteoporosis    Cerebral Aneurysm Mother     Thyroid Disease Father     Stroke Maternal Grandmother     Other Maternal Grandfather         Aneurysm    Stroke Maternal Grandfather     Heart Disease Paternal Grandmother     Cancer Paternal Grandfather         lung    Heart Disease Other         Paternal Side of Family    Breast Cancer Neg Hx        Social History     Socioeconomic History    Marital status:      Spouse name: Not on file    Number of children: Not on file    Years of education: Not on file    Highest education level: Not on file   Occupational History    Not on file   Tobacco Use    Smoking status: Never    Smokeless tobacco: Never   Vaping Use    Vaping Use: Never used   Substance and Sexual Activity    Alcohol use: Yes    Drug use:  No

## 2023-07-21 LAB
CYTOLOGIST CVX/VAG CYTO: NORMAL
CYTOLOGY CVX/VAG DOC CYTO: NORMAL
CYTOLOGY CVX/VAG DOC THIN PREP: NORMAL
DX ICD CODE: NORMAL
HPV GENOTYPE REFLEX: NORMAL
HPV I/H RISK 4 DNA CVX QL PROBE+SIG AMP: NEGATIVE
Lab: NORMAL
OTHER STN SPEC: NORMAL
STAT OF ADQ CVX/VAG CYTO-IMP: NORMAL

## 2023-08-03 ENCOUNTER — TELEPHONE (OUTPATIENT)
Age: 48
End: 2023-08-03

## 2023-08-03 DIAGNOSIS — Z82.49 FAMILY HISTORY OF ANEURYSM: Primary | ICD-10-CM

## 2023-08-03 NOTE — TELEPHONE ENCOUNTER
Kendy Longoria from [facility] [place] calling , verified name and  of patient she just needs order fixed to just MRA head without contrast they don't do contrast unless there is metal in the head.

## 2023-08-05 ENCOUNTER — HOSPITAL ENCOUNTER (OUTPATIENT)
Facility: HOSPITAL | Age: 48
End: 2023-08-05
Attending: OBSTETRICS & GYNECOLOGY
Payer: COMMERCIAL

## 2023-08-05 DIAGNOSIS — Z82.49 FAMILY HISTORY OF ANEURYSM: ICD-10-CM

## 2023-08-05 PROCEDURE — 70544 MR ANGIOGRAPHY HEAD W/O DYE: CPT

## 2023-08-22 RX ORDER — ESTRADIOL 0.05 MG/D
1 PATCH, EXTENDED RELEASE TRANSDERMAL
Qty: 8 PATCH | Refills: 0 | Status: SHIPPED | OUTPATIENT
Start: 2023-08-24

## 2023-09-19 RX ORDER — ESTRADIOL 0.05 MG/D
1 PATCH, EXTENDED RELEASE TRANSDERMAL
Qty: 24 PATCH | Refills: 4 | Status: SHIPPED | OUTPATIENT
Start: 2023-09-21

## 2023-09-19 RX ORDER — BUSPIRONE HYDROCHLORIDE 7.5 MG/1
7.5 TABLET ORAL 3 TIMES DAILY
Qty: 90 TABLET | Refills: 3 | Status: SHIPPED | OUTPATIENT
Start: 2023-09-19

## 2023-09-19 NOTE — PROGRESS NOTES
Pt called and requested a 90 day supply of estradiol patch and for refills for buspar. Rx buspar sent, but advised to FU with mental health provider.        Gayle Schofield MD

## 2023-09-25 RX ORDER — ONDANSETRON 4 MG/1
4 TABLET, ORALLY DISINTEGRATING ORAL 3 TIMES DAILY PRN
Qty: 20 TABLET | Refills: 0 | Status: SHIPPED | OUTPATIENT
Start: 2023-09-25

## 2023-09-25 NOTE — PROGRESS NOTES
Pt called with feeling GI upset with nausea all day. Requests rx for zofran ODT, rx sent and precautions reviewed.      Percy Howard MD

## 2023-10-05 ENCOUNTER — HOSPITAL ENCOUNTER (OUTPATIENT)
Age: 48
Discharge: HOME | End: 2023-10-05
Payer: COMMERCIAL

## 2023-10-05 VITALS — BODY MASS INDEX: 19.62 KG/M2 | WEIGHT: 125 LBS | HEIGHT: 67 IN

## 2023-10-05 DIAGNOSIS — Z12.31 ENCOUNTER FOR SCREENING MAMMOGRAM FOR MALIGNANT NEOPLASM OF BREAST: ICD-10-CM

## 2023-10-05 DIAGNOSIS — Z01.419 ENCOUNTER FOR WELL WOMAN EXAM WITH ROUTINE GYNECOLOGICAL EXAM: ICD-10-CM

## 2023-10-05 PROCEDURE — 77063 BREAST TOMOSYNTHESIS BI: CPT

## 2023-11-22 ENCOUNTER — APPOINTMENT (OUTPATIENT)
Facility: HOSPITAL | Age: 48
End: 2023-11-22
Payer: COMMERCIAL

## 2023-11-22 ENCOUNTER — HOSPITAL ENCOUNTER (EMERGENCY)
Facility: HOSPITAL | Age: 48
Discharge: HOME | End: 2023-11-22
Attending: EMERGENCY MEDICINE
Payer: COMMERCIAL

## 2023-11-22 VITALS
RESPIRATION RATE: 15 BRPM | TEMPERATURE: 97.8 F | DIASTOLIC BLOOD PRESSURE: 78 MMHG | WEIGHT: 125 LBS | OXYGEN SATURATION: 100 % | HEART RATE: 65 BPM | BODY MASS INDEX: 19.62 KG/M2 | HEIGHT: 67 IN | SYSTOLIC BLOOD PRESSURE: 131 MMHG

## 2023-11-22 DIAGNOSIS — R00.2 PALPITATIONS: Primary | ICD-10-CM

## 2023-11-22 LAB
ALBUMIN SERPL-MCNC: 4.1 G/DL (ref 3.5–5)
ALBUMIN/GLOB SERPL: 1.2 (ref 1.1–2.2)
ALP SERPL-CCNC: 50 U/L (ref 45–117)
ALT SERPL-CCNC: 18 U/L (ref 12–78)
ANION GAP SERPL CALC-SCNC: 10 MMOL/L (ref 5–15)
AST SERPL-CCNC: 11 U/L (ref 15–37)
BASOPHILS # BLD: 0.1 K/UL (ref 0–0.1)
BASOPHILS NFR BLD: 1 % (ref 0–1)
BILIRUB SERPL-MCNC: 0.6 MG/DL (ref 0.2–1)
BUN SERPL-MCNC: 11 MG/DL (ref 6–20)
BUN/CREAT SERPL: 15 (ref 12–20)
CALCIUM SERPL-MCNC: 10.1 MG/DL (ref 8.5–10.1)
CHLORIDE SERPL-SCNC: 104 MMOL/L (ref 97–108)
CO2 SERPL-SCNC: 25 MMOL/L (ref 21–32)
COMMENT:: NORMAL
CREAT SERPL-MCNC: 0.75 MG/DL (ref 0.55–1.02)
D DIMER PPP FEU-MCNC: <0.19 MG/L FEU (ref 0–0.65)
DIFFERENTIAL METHOD BLD: NORMAL
EOSINOPHIL # BLD: 0.4 K/UL (ref 0–0.4)
EOSINOPHIL NFR BLD: 4 % (ref 0–7)
ERYTHROCYTE [DISTWIDTH] IN BLOOD BY AUTOMATED COUNT: 11.6 % (ref 11.5–14.5)
GLOBULIN SER CALC-MCNC: 3.3 G/DL (ref 2–4)
GLUCOSE SERPL-MCNC: 96 MG/DL (ref 65–100)
HCT VFR BLD AUTO: 39.2 % (ref 35–47)
HGB BLD-MCNC: 13.7 G/DL (ref 11.5–16)
IMM GRANULOCYTES # BLD AUTO: 0 K/UL (ref 0–0.04)
IMM GRANULOCYTES NFR BLD AUTO: 0 % (ref 0–0.5)
LIPASE SERPL-CCNC: 27 U/L (ref 13–75)
LYMPHOCYTES # BLD: 2.6 K/UL (ref 0.8–3.5)
LYMPHOCYTES NFR BLD: 27 % (ref 12–49)
MAGNESIUM SERPL-MCNC: 2 MG/DL (ref 1.6–2.4)
MCH RBC QN AUTO: 31.9 PG (ref 26–34)
MCHC RBC AUTO-ENTMCNC: 34.9 G/DL (ref 30–36.5)
MCV RBC AUTO: 91.4 FL (ref 80–99)
MONOCYTES # BLD: 0.6 K/UL (ref 0–1)
MONOCYTES NFR BLD: 6 % (ref 5–13)
NEUTS SEG # BLD: 6 K/UL (ref 1.8–8)
NEUTS SEG NFR BLD: 62 % (ref 32–75)
NRBC # BLD: 0 K/UL (ref 0–0.01)
NRBC BLD-RTO: 0 PER 100 WBC
PLATELET # BLD AUTO: 192 K/UL (ref 150–400)
PMV BLD AUTO: 11.4 FL (ref 8.9–12.9)
POTASSIUM SERPL-SCNC: 3.5 MMOL/L (ref 3.5–5.1)
PROT SERPL-MCNC: 7.4 G/DL (ref 6.4–8.2)
RBC # BLD AUTO: 4.29 M/UL (ref 3.8–5.2)
SODIUM SERPL-SCNC: 139 MMOL/L (ref 136–145)
SPECIMEN HOLD: NORMAL
TROPONIN I SERPL HS-MCNC: 8 NG/L (ref 0–51)
TSH SERPL DL<=0.05 MIU/L-ACNC: 1.96 UIU/ML (ref 0.36–3.74)
WBC # BLD AUTO: 9.7 K/UL (ref 3.6–11)

## 2023-11-22 PROCEDURE — 85379 FIBRIN DEGRADATION QUANT: CPT

## 2023-11-22 PROCEDURE — 83690 ASSAY OF LIPASE: CPT

## 2023-11-22 PROCEDURE — 84443 ASSAY THYROID STIM HORMONE: CPT

## 2023-11-22 PROCEDURE — 2580000003 HC RX 258: Performed by: EMERGENCY MEDICINE

## 2023-11-22 PROCEDURE — 84484 ASSAY OF TROPONIN QUANT: CPT

## 2023-11-22 PROCEDURE — 85025 COMPLETE CBC W/AUTO DIFF WBC: CPT

## 2023-11-22 PROCEDURE — 93005 ELECTROCARDIOGRAM TRACING: CPT | Performed by: EMERGENCY MEDICINE

## 2023-11-22 PROCEDURE — 99285 EMERGENCY DEPT VISIT HI MDM: CPT

## 2023-11-22 PROCEDURE — 80053 COMPREHEN METABOLIC PANEL: CPT

## 2023-11-22 PROCEDURE — 83735 ASSAY OF MAGNESIUM: CPT

## 2023-11-22 PROCEDURE — 71045 X-RAY EXAM CHEST 1 VIEW: CPT

## 2023-11-22 PROCEDURE — 96360 HYDRATION IV INFUSION INIT: CPT

## 2023-11-22 RX ORDER — HYDROXYZINE PAMOATE 25 MG/1
25 CAPSULE ORAL 3 TIMES DAILY PRN
Qty: 20 CAPSULE | Refills: 0 | Status: SHIPPED | OUTPATIENT
Start: 2023-11-22 | End: 2023-11-22 | Stop reason: SDUPTHER

## 2023-11-22 RX ORDER — HYDROXYZINE PAMOATE 25 MG/1
25 CAPSULE ORAL 3 TIMES DAILY PRN
Qty: 20 CAPSULE | Refills: 0 | Status: SHIPPED | OUTPATIENT
Start: 2023-11-22

## 2023-11-22 RX ORDER — SODIUM CHLORIDE, SODIUM LACTATE, POTASSIUM CHLORIDE, AND CALCIUM CHLORIDE .6; .31; .03; .02 G/100ML; G/100ML; G/100ML; G/100ML
1000 INJECTION, SOLUTION INTRAVENOUS ONCE
Status: COMPLETED | OUTPATIENT
Start: 2023-11-22 | End: 2023-11-22

## 2023-11-22 RX ADMIN — SODIUM CHLORIDE, POTASSIUM CHLORIDE, SODIUM LACTATE AND CALCIUM CHLORIDE 1000 ML: 600; 310; 30; 20 INJECTION, SOLUTION INTRAVENOUS at 18:38

## 2023-11-22 ASSESSMENT — PAIN DESCRIPTION - ORIENTATION: ORIENTATION: MID

## 2023-11-22 ASSESSMENT — PAIN DESCRIPTION - LOCATION: LOCATION: CHEST

## 2023-11-22 ASSESSMENT — PAIN DESCRIPTION - FREQUENCY: FREQUENCY: INTERMITTENT

## 2023-11-22 ASSESSMENT — PAIN DESCRIPTION - PAIN TYPE: TYPE: ACUTE PAIN

## 2023-11-22 ASSESSMENT — PAIN DESCRIPTION - DESCRIPTORS: DESCRIPTORS: PRESSURE

## 2023-11-22 ASSESSMENT — PAIN SCALES - GENERAL: PAINLEVEL_OUTOF10: 2

## 2023-11-22 ASSESSMENT — LIFESTYLE VARIABLES
HOW MANY STANDARD DRINKS CONTAINING ALCOHOL DO YOU HAVE ON A TYPICAL DAY: 1 OR 2
HOW OFTEN DO YOU HAVE A DRINK CONTAINING ALCOHOL: 2-4 TIMES A MONTH

## 2023-11-22 ASSESSMENT — PAIN - FUNCTIONAL ASSESSMENT: PAIN_FUNCTIONAL_ASSESSMENT: ACTIVITIES ARE NOT PREVENTED

## 2023-11-22 NOTE — ED PROVIDER NOTES
reviewed. The patient was interviewed and an examination was completed by me with the above findings. Patient was placed on a cardiac monitor secondary to an indication of palpitations. MDM:    This is a 53-year-old female presents to the ED via POV feeling of palpitations. Onset of symptoms began 2 weeks ago. Symptoms located to the cardiovascular system patient occasionally with feeling her heart skipping a beat. She has intermittent shortness of breath with this. She has not taken medication for symptoms. Patient she has not followed with PCP, because she is having changes in her insurance. She denies any chest pain with this. She does use an estrogen patch. While observing the patient on the cardiac monitor patient does do occasional PVCs. Lab work EKG obtained. Patient given IV fluids. Lab work did return reassuring including a D-dimer and TSH. Chest x-ray did not show any acute findings. At this time patient stable for discharge home. She states she is in between obtaining a new PCP because of change of insurance. She is advised to call consult hotline to obtain a PCP, she is given information for cardiologist.  Patient also mentions that the stress of changing jobs and insurance may also be playing a role in her symptoms. Will give her prescription for Vistaril as needed for anxiety. Patient given return precautions. It is my clinical impression today patient presents for: Palpitations. I've discussed my findings, impression in detail with the patient at the bedside. I have provided the opportunity to ask questions, and have addressed all questions at the bedside. Expectations, return precautions verbalized at bedside. At this time I do feel patient is stable for discharge. I feel no further laboratory evaluation/imaging is indicated. At this time patient will be discharged in stable and non toxic condition.  Patient has been advised to

## 2023-11-23 NOTE — DISCHARGE INSTRUCTIONS
Routine appointments for health maintenance with a primary care provider are very important and emergency department visits are no substitute. You should review all findings and test results from your visit today with your primary care physician. We recommended that you take medications as prescribed. Drink plenty of fluids. It is important to obtain a primary care provider, so that you may follow-up with the symptoms. You may require a Holter monitor. You have also been given information for cardiologist      Return to the emergency department for any new or concerning signs/symptoms or failure to improve.

## 2023-11-23 NOTE — ED NOTES
Bedside shift change report given to James Pyle (oncoming nurse) by Lalit Rocha RN (offgoing nurse). Report included the following information ED SBAR.        Fritz Cano RN  11/22/23 1926

## 2023-11-24 LAB
EKG ATRIAL RATE: 79 BPM
EKG DIAGNOSIS: NORMAL
EKG P AXIS: 76 DEGREES
EKG P-R INTERVAL: 134 MS
EKG Q-T INTERVAL: 400 MS
EKG QRS DURATION: 86 MS
EKG QTC CALCULATION (BAZETT): 458 MS
EKG R AXIS: 71 DEGREES
EKG T AXIS: 35 DEGREES
EKG VENTRICULAR RATE: 79 BPM

## 2023-11-24 PROCEDURE — 93010 ELECTROCARDIOGRAM REPORT: CPT | Performed by: INTERNAL MEDICINE

## 2024-02-13 RX ORDER — ESTRADIOL 0.05 MG/D
1 PATCH, EXTENDED RELEASE TRANSDERMAL
Qty: 24 PATCH | Refills: 4 | Status: SHIPPED | OUTPATIENT
Start: 2024-02-15